# Patient Record
Sex: MALE | Race: WHITE | NOT HISPANIC OR LATINO | Employment: UNEMPLOYED | URBAN - NONMETROPOLITAN AREA
[De-identification: names, ages, dates, MRNs, and addresses within clinical notes are randomized per-mention and may not be internally consistent; named-entity substitution may affect disease eponyms.]

---

## 2022-02-07 ENCOUNTER — HOSPITAL ENCOUNTER (EMERGENCY)
Facility: HOSPITAL | Age: 62
Discharge: HOME | End: 2022-02-07
Attending: EMERGENCY MEDICINE
Payer: MEDICARE

## 2022-02-07 VITALS
OXYGEN SATURATION: 100 % | DIASTOLIC BLOOD PRESSURE: 69 MMHG | HEART RATE: 58 BPM | RESPIRATION RATE: 18 BRPM | SYSTOLIC BLOOD PRESSURE: 137 MMHG | TEMPERATURE: 98 F | WEIGHT: 191.38 LBS

## 2022-02-07 DIAGNOSIS — T14.90XA INJURY: ICD-10-CM

## 2022-02-07 DIAGNOSIS — S93.401A SPRAIN OF RIGHT ANKLE, UNSPECIFIED LIGAMENT, INITIAL ENCOUNTER: Primary | ICD-10-CM

## 2022-02-07 PROCEDURE — 99283 EMERGENCY DEPT VISIT LOW MDM: CPT | Mod: 25

## 2022-02-07 RX ORDER — CARIPRAZINE 6 MG/1
1 CAPSULE, GELATIN COATED ORAL NIGHTLY
COMMUNITY
Start: 2022-01-19

## 2022-02-07 RX ORDER — DICLOFENAC SODIUM 75 MG/1
75 TABLET, DELAYED RELEASE ORAL 2 TIMES DAILY
Qty: 10 TABLET | Refills: 0 | Status: SHIPPED | OUTPATIENT
Start: 2022-02-07 | End: 2022-02-12

## 2022-02-07 RX ORDER — LAMOTRIGINE 200 MG/1
100 TABLET ORAL NIGHTLY
COMMUNITY
Start: 2022-01-19 | End: 2022-07-26

## 2022-02-07 NOTE — ED PROVIDER NOTES
"Encounter Date: 2/7/2022       History     Chief Complaint   Patient presents with    Ankle Pain     Pt stated that he [place]pped and fell [place]". Pt presents to ED with complaints of right ankle pain.      This is a 62 y/o wm with noncontributory pmhx who presents to the ED with c/o right ankle pain after twisting it while walking [place] the beach 3 days ago. Pt reports pain, brusing, and swelling to right lateral ankle worse with ambulation. Pt denies any other injuries/concerns at this time.         Review of patient's allergies indicates:  No Known Allergies  Past Medical History:   Diagnosis Date    Behavioral problem     History of psychiatric hospitalization     Psychosis      No past surgical history on file.  No family history on file.  Social History     Tobacco Use    Smoking status: Unknown If Ever Smoked   Substance Use Topics    Alcohol use: No    Drug use: No     Review of Systems   Constitutional: Negative.    Respiratory: Negative.    Cardiovascular: Negative.    Musculoskeletal: Positive for arthralgias, gait problem and joint swelling.   Neurological: Negative for numbness.       Physical Exam     Initial Vitals [02/07/22 0945]   BP Pulse Resp Temp SpO2   137/69 (!) 58 18 97.8 °F (36.6 °C) 100 %      MAP       --         Physical Exam    Nursing note and vitals reviewed.  Constitutional: He appears well-developed and well-nourished. He is active. No distress.   HENT:   Head: Normocephalic and atraumatic.   Eyes: EOM are normal. Pupils are equal, round, and reactive to light.   Neck: Neck supple.   Normal range of motion.  Cardiovascular: Normal rate, regular rhythm and normal heart sounds.   Musculoskeletal:         General: Tenderness and edema present.      Cervical back: Normal range of motion and neck supple.      Comments: Right lateral ankle appears swollen upon inspection with mild bruising.  The right anterior and posterior malleolus is tender to palpation.  Dorsalis pedis " pulses 2+ and capillary refill is less than 2 seconds.     Neurological: He is alert and oriented to person, place, and time. GCS score is 15. GCS eye subscore is 4. GCS verbal subscore is 5. GCS motor subscore is 6.   Skin: Skin is warm and dry. Capillary refill takes less than 2 seconds.   Psychiatric: He has a normal mood and affect. His behavior is normal. Thought content normal.         ED Course   Procedures  Labs Reviewed - No data to display       Imaging Results          X-Ray Ankle Complete Right (In process)                  Medications - No data to display              ED Course as of 02/07/22 1012   Mon Feb 07, 2022   1010 No acute findings on right ankle x-ray [CB]      ED Course User Index  [CB] Kirsty Spears NP             Clinical Impression:   Final diagnoses:  [T14.90XA] Injury  [S93.401A] Sprain of right ankle, unspecified ligament, initial encounter (Primary)          ED Disposition Condition    Discharge Stable        ED Prescriptions     Medication Sig Dispense Start Date End Date Auth. Provider    diclofenac (VOLTAREN) 75 MG EC tablet Take 1 tablet (75 mg total) by mouth 2 (two) times daily. for 5 days 10 tablet 2/7/2022 2/12/2022 Kirsty Spears NP        Follow-up Information     Follow up With Specialties Details Why Contact Info    PCP Follow UP  Call in 2 days for follow-up, for re-evaluation of today's complaint            Kirsty Spears NP  02/07/22 1012

## 2022-02-07 NOTE — DISCHARGE INSTRUCTIONS
Take medication as directed and apply ice to help reduce pain and swelling.  See your primary doctor in 1-2 days, and return to the emergency room for worsening condition.

## 2022-02-07 NOTE — Clinical Note
"Karan"Tim Hoover was seen and treated in our emergency department on 2/7/2022.  He may return to work on 02/08/2022.       If you have any questions or concerns, please don't hesitate to call.      Kirsty Spears NP"

## 2022-02-14 ENCOUNTER — HOSPITAL ENCOUNTER (EMERGENCY)
Facility: HOSPITAL | Age: 62
Discharge: HOME | End: 2022-02-14
Attending: EMERGENCY MEDICINE
Payer: MEDICARE

## 2022-02-14 VITALS
HEIGHT: 65 IN | SYSTOLIC BLOOD PRESSURE: 133 MMHG | TEMPERATURE: 98 F | HEART RATE: 65 BPM | WEIGHT: 191 LBS | BODY MASS INDEX: 31.82 KG/M2 | RESPIRATION RATE: 18 BRPM | DIASTOLIC BLOOD PRESSURE: 64 MMHG | OXYGEN SATURATION: 100 %

## 2022-02-14 DIAGNOSIS — S93.401D SPRAIN OF RIGHT ANKLE, UNSPECIFIED LIGAMENT, SUBSEQUENT ENCOUNTER: Primary | ICD-10-CM

## 2022-02-14 PROCEDURE — 99282 EMERGENCY DEPT VISIT SF MDM: CPT

## 2022-02-14 NOTE — ED PROVIDER NOTES
"Encounter Date: 2/14/2022       History     Chief Complaint   Patient presents with    Ankle Pain     "I need to see about my x-ray. I slipped and fell." Patient reports right ankle pain. Seen here on 2/7/22.     This is a 61-year-old male with history of psychiatric issues presents to the emergency department to get the results of his x-ray after spraining his ankle on February 7th.  X-rays were taken then which should not sugar any acute fracture.  Chronic changes were noted.  Denies any further injury.  Requesting an Ace wrap.  Steady gait        Review of patient's allergies indicates:  No Known Allergies  Past Medical History:   Diagnosis Date    Behavioral problem     History of psychiatric hospitalization     Psychosis      History reviewed. No pertinent surgical history.  History reviewed. No pertinent family history.  Social History     Tobacco Use    Smoking status: Unknown If Ever Smoked   Substance Use Topics    Alcohol use: No    Drug use: No     Review of Systems   Constitutional: Negative for fever.   HENT: Negative for sore throat.    Respiratory: Negative for shortness of breath.    Cardiovascular: Negative for chest pain.   Gastrointestinal: Negative for nausea.   Genitourinary: Negative for dysuria.   Musculoskeletal: Negative for back pain.   Skin: Negative for rash.   Neurological: Negative for weakness.   Hematological: Does not bruise/bleed easily.   All other systems reviewed and are negative.      Physical Exam     Initial Vitals [02/14/22 0813]   BP Pulse Resp Temp SpO2   133/64 65 18 97.9 °F (36.6 °C) 100 %      MAP       --         Physical Exam    Nursing note and vitals reviewed.  Constitutional: He appears well-developed and well-nourished. He is not diaphoretic. No distress.   HENT:   Head: Normocephalic and atraumatic.   Eyes: Conjunctivae and EOM are normal. Pupils are equal, round, and reactive to light. Right eye exhibits no discharge. Left eye exhibits no discharge. No " scleral icterus.   Neck: Neck supple. No JVD present.   Normal range of motion.  Cardiovascular: Normal rate, regular rhythm, normal heart sounds and intact distal pulses.   No murmur heard.  Pulmonary/Chest: Breath sounds normal. No stridor. No respiratory distress. He has no wheezes. He has no rhonchi. He has no rales. He exhibits no tenderness.   Abdominal: Abdomen is soft. Bowel sounds are normal. He exhibits no distension and no mass. There is no abdominal tenderness. There is no rebound and no guarding.   Musculoskeletal:         General: No tenderness or edema. Normal range of motion.      Cervical back: Normal range of motion and neck supple.      Comments: Neurovascularly intact, steady gait, minimal ecchymosis noted to medial arch of right foot     Neurological: He is alert and oriented to person, place, and time. He has normal strength. GCS score is 15. GCS eye subscore is 4. GCS verbal subscore is 5. GCS motor subscore is 6.   Skin: Skin is warm and dry. Capillary refill takes less than 2 seconds.         ED Course   Procedures  Labs Reviewed - No data to display       Imaging Results    None          Medications - No data to display  Medical Decision Making:   Differential Diagnosis:   Ankle sprain                      Clinical Impression:   Final diagnoses:  [S93.401D] Sprain of right ankle, unspecified ligament, subsequent encounter (Primary)          ED Disposition Condition    Discharge Stable        ED Prescriptions     None        Follow-up Information     Follow up With Specialties Details Why Contact Info Additional Information    [facility] Emergency Department Emergency Medicine  As needed [address] [id number] [phone] Floor [address]           Zachary Quiroz MD  02/14/22 0822

## 2022-02-18 ENCOUNTER — PATIENT OUTREACH (OUTPATIENT)
Dept: EMERGENCY MEDICINE | Facility: HOSPITAL | Age: 62
End: 2022-02-18
Payer: MEDICARE

## 2022-02-23 NOTE — PROGRESS NOTES
Emmie Jean, Patient Care Assistant  ED Navigator  Emergency Department    Project: [facility] ED Navigator  Role: Community Health Worker    Date: 02/23/2022  Patient Name: Karan Hoover  MRN: [id number]  PCP: No primary care provider on file.    Assessment:     Karan Hoover is a 61 y.o. male who has presented to ED for ankle pain. Patient has visited the ED 2 times in the past 3 months. Patient does not have a PCP.     ED Navigator Initial Assessment    ED Navigator Enrollment Documentation  Consent to Services  Does patient consent to completing the assessment?: Yes  Contact  Method of Initial Contact: Phone  Transportation  Does the patient have issues with Transportation?: No  Does the patient have transportation to and from healthcare appointments?: Yes  Insurance Coverage  Do you have coverage/adequate coverage?: Yes  Type/kind of coverage: MEDICARE PART A & B / [place]  Is patient able to afford co-pays/deductibles?: Yes  Is patient able to afford HME or supplies?: Yes  Does patient have an established [facility] PCP?: No  Does patient need assistance finding a PCP?: Yes  Does the patient have a lack of adequate coverage?: No  Specialist Appointment  Did the patient come to the ED to see a specialist?: No  Does the patient have a pending specialist referral?: No  Does the patient have a specialist appointment made?: No  PCP Follow Up Appointment  Has the patient had an appointment with a primary care provider in the past year?: No (Comment: Patient stated he has not seen a primary care physician in years.)  Does the patient have a follow up appontment with a PCP?: Yes  Upcoming appointment date: 3/7/22  Provider: Ilana Liu, DO  Medications  Is patient able to afford medication?: Yes  Is patient unable to get medication due to lack of transportation?: No  Psychological  Does the patient have psycho-social concerns?: No  Food  Does the patient have concerns about food?: Yes  What concerns does the patient  have regarding food?: Lack of food  Communication/Education  Does the patient have limited English proficiency/English not primary language?: No  Does patient have low literacy and/or low health literacy?: Yes  Does patient have concerns with care?: No  Does patient have dissatisfaction with care?: No  Other Financial Concerns  Does the patient have immediate financial distress?: No  Does the patient have general financial concerns?: Yes  Other Social Barriers/Concerns  Does the patient have any additional barriers or concerns?: None  Primary Barrier  Barriers identified: Cognitive barrier (health literacy, language and communication, etc.), Structural barrier (service availability, waiting times, etc.)  Root Cause of ED Utilization: Lack of Access to Primary Care  Plan to address Lack of Access to Primary Care: Provided information for [facility] 24/7 Nurse Triage line [phone] or 1-866-[facility] ([phone])  Next steps: Provided Education, Scheduled Appointment/Referral  Scheduled Appointment Date: 3/7/22  Appointment Reminder Date: 3/4/22  Was education/educational materials provided surrounding PCP services/creating a medical home?: Yes Was education verbal or written?: Written   Was education/educational materials provided surrounding low cost, healthy foods?: Yes Was education verbal or written?: Written   Was education/educational materials provided surrounding other items? If so, use comment to explain.: Yes Was education verbal or written?: Written   Plan: The patient was given food bank/Pantry resources for their region.  Expected Date of Follow Up 1: 3/4/22  Additional Documentation: Patient expressed he does not live in the area anymore as his old PCP and has not seen him in many years; therefore, he does not have a PCP at the moment. Patient would like assistance to establish care with a PCP in his area. Patient specified he would like to be seen March 7th at 2:00 p.m. Patient expressed  his concern of lack of food. Patient is interested in getting sent a list of food barboza in his area. Patient declined being sent a food stamp application and stated he is not on food stamps at the moment. Patient explained he has no other needs. Patient agrees to a follow-up call the Friday before his Monday appointment for a reminder.    ED navigator assisted patient with establishing care with a PCP by scheduling him an appointment with Ilana Liu DO for 3/7/2022 at 2:00 p.m. ED navigator ensured to meet all of patient's needs. ED navigator provided patient with the following at his address listed in epic: an appointment card with his appointment information on it, a list of food barboza in his area, the [facility] 24/7 Nurse triage line, [phone] or 1-[facility] ([phone]) contact information, education to choose the Right level of care at the Right place, and education on [facility]'s Virtual visit program. ED navigator will follow-up with patient on 3/4/2022 to ensure he received his resources, see if he has any other needs, and to remind him of his appointment on 3/7/2022.    Emmie Jean  ED Navigator- [place]          Social History     Socioeconomic History    Marital status: Single   Tobacco Use    Smoking status: Unknown If Ever Smoked   Substance and Sexual Activity    Alcohol use: No    Drug use: No     Social Determinants of Health     Financial Resource Strain: Medium Risk    Difficulty of Paying Living Expenses: Somewhat hard   Food Insecurity: Food Insecurity Present    Worried About Running Out of Food in the Last Year: Sometimes true    Ran Out of Food in the Last Year: Sometimes true   Transportation Needs: No Transportation Needs    Lack of Transportation (Medical): No    Lack of Transportation (Non-Medical): No   Physical Activity: Insufficiently Active    Days of Exercise per Week: 3 days    Minutes of Exercise per Session: 30 min   Stress: No Stress  Concern Present    Feeling of Stress : Only a little   Social Connections: Socially Isolated    Frequency of Communication with Friends and Family: Twice a week    Frequency of Social Gatherings with Friends and Family: Once a week    Attends [employer] Services: Never    Active Member of Clubs or Organizations: No    Attends Club or Organization Meetings: Never    Marital Status: Never    Housing Stability: Low Risk     Unable to Pay for Housing in the Last Year: No    Number of Places Lived in the Last Year: 1    Unstable Housing in the Last Year: No       Plan:     Patient expressed he does not live in the area anymore as his old PCP and has not seen him in many years; therefore, he does not have a PCP at the moment. Patient would like assistance to establish care with a PCP in his area. Patient specified he would like to be seen March 7th at 2:00 p.m. Patient expressed his concern of lack of food. Patient is interested in getting sent a list of food barboza in his area. Patient declined being sent a food stamp application and stated he is not on food stamps at the moment. Patient explained he has no other needs. Patient agrees to a follow-up call the Friday before his Monday appointment for a reminder.    ED navigator assisted patient with establishing care with a PCP by scheduling him an appointment with Ilana Liu DO for 3/7/2022 at 2:00 p.m. ED navigator ensured to meet all of patient's needs. ED navigator provided patient with the following at his address listed in epic: an appointment card with his appointment information on it, a list of food barboza in his area, the [facility] 24/7 Nurse triage line, [phone] or [facility] ([phone]) contact information, education to choose the Right level of care at the Right place, and education on [facility]'s Virtual visit program. ED navigator will follow-up with patient on 3/4/2022 to ensure he received his resources, see if he  has any other needs, and to remind him of his appointment on 3/7/2022.    Emmie Jean ED Navigator- [place]

## 2022-03-04 ENCOUNTER — PATIENT OUTREACH (OUTPATIENT)
Dept: EMERGENCY MEDICINE | Facility: HOSPITAL | Age: 62
End: 2022-03-04
Payer: MEDICARE

## 2022-03-04 NOTE — PROGRESS NOTES
ED navigator called to remind patient about his appointment on Monday (3/7/2022). Patient did not answer, so a VM with all details to his appointment was left in the message. ED navigator inquired to patient if he could give her a call back in order for her to know he did receive the message.    Emmie Jean  ED Navigator- [place]

## 2022-03-07 ENCOUNTER — OFFICE VISIT (OUTPATIENT)
Dept: PRIMARY CARE CLINIC | Facility: CLINIC | Age: 62
End: 2022-03-07
Payer: MEDICARE

## 2022-03-07 VITALS
DIASTOLIC BLOOD PRESSURE: 66 MMHG | OXYGEN SATURATION: 98 % | TEMPERATURE: 98 F | WEIGHT: 194.75 LBS | HEART RATE: 61 BPM | BODY MASS INDEX: 32.45 KG/M2 | HEIGHT: 65 IN | SYSTOLIC BLOOD PRESSURE: 116 MMHG

## 2022-03-07 DIAGNOSIS — G57.51 TARSAL TUNNEL SYNDROME OF RIGHT SIDE: Primary | ICD-10-CM

## 2022-03-07 DIAGNOSIS — M21.41 BILATERAL PES PLANUS: ICD-10-CM

## 2022-03-07 DIAGNOSIS — M21.42 BILATERAL PES PLANUS: ICD-10-CM

## 2022-03-07 PROCEDURE — 99203 PR OFFICE/OUTPT VISIT, NEW, LEVL III, 30-44 MIN: ICD-10-PCS | Mod: S$PBB,,, | Performed by: STUDENT IN AN ORGANIZED HEALTH CARE EDUCATION/TRAINING PROGRAM

## 2022-03-07 PROCEDURE — 99203 OFFICE O/P NEW LOW 30 MIN: CPT | Mod: S$PBB,,, | Performed by: STUDENT IN AN ORGANIZED HEALTH CARE EDUCATION/TRAINING PROGRAM

## 2022-03-07 PROCEDURE — 99999 PR PBB SHADOW E&M-EST. PATIENT-LVL III: CPT | Mod: PBBFAC,,, | Performed by: STUDENT IN AN ORGANIZED HEALTH CARE EDUCATION/TRAINING PROGRAM

## 2022-03-07 PROCEDURE — 99999 PR PBB SHADOW E&M-EST. PATIENT-LVL III: ICD-10-PCS | Mod: PBBFAC,,, | Performed by: STUDENT IN AN ORGANIZED HEALTH CARE EDUCATION/TRAINING PROGRAM

## 2022-03-07 PROCEDURE — 99213 OFFICE O/P EST LOW 20 MIN: CPT | Mod: PBBFAC,PN | Performed by: STUDENT IN AN ORGANIZED HEALTH CARE EDUCATION/TRAINING PROGRAM
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
## 2022-03-08 NOTE — PROGRESS NOTES
Patient stated he was satisfied with the PCP he established care with. Patient revealed he did not receive the resources mailed to him yet. Patient explained he has no other needs at this time.    ED navigator ensured patient was satisfied with the PCP and inquired if he received the resources yet; he stated he did not. ED navigator explained to patient that she will give it a few more days to see if the resources will arrive and follow-up with him next week, and if still not received, she will send them again; patient understood and agreed. ED navigator will follow-up with patient on/around 3/18/2022.    Emmie Jean  ED Navigator- [place]

## 2022-03-09 NOTE — ASSESSMENT & PLAN NOTE
Acute ankle sprain with superimposed plantar fasciitis likely contributed by patient's by poor arch support in anatomy.  Recommended getting in soles Superfeet Blue (medium sole) for both feet.   - see above for further recommendations

## 2022-03-09 NOTE — ASSESSMENT & PLAN NOTE
- stay physically active, imagining with no fractures, so may ambulate as tolerated  - maintain daily adequate hydration 1.5 to 2L fluid volume  - as tolerated incorporate resistance training with stretching and cardio  - goal of 150 minutes per week of moderate intensity activity or 7,500 - 10,000 steps per day  - follow the Mediterranean diet  - include whole fresh fruits, vegetables, olive oil, seeds, nuts, whole grains, cold water fish, salmon, mackerel and lean cuts of meat    - do not drink sugary/diet carbonated beverages  - decrease portion sizes slightly which will result in an approximately 500-calorie deficit  - avoid fast or fried and processed food, especially canned foods   - avoid refined carbohydrates, white starchy foods, flour, white potato, bread, muffins, and cakes

## 2022-03-09 NOTE — ASSESSMENT & PLAN NOTE
After review of imaging results with patient, counseled on the biomechanics and patient's structure and anatomy that is contributing to his ankle pain. Will continue conservative management of pain.   - Tylenol  mg + ibuprofen 200 mg take twice daily and may increase to three times a day as needed  - wearing appropriate shoes with proper arch support with firm insoles, such as Superfeet Blue  - continue with ambulation and avoid strenuous activity until heal pain resolves  - in the morning, when ankle pain is the worst, take iced bottle water and roll feet over  - f/u 4 weeks or sooner with worsening no improvement to pain, will f/u with MRI

## 2022-03-18 ENCOUNTER — PATIENT OUTREACH (OUTPATIENT)
Dept: EMERGENCY MEDICINE | Facility: HOSPITAL | Age: 62
End: 2022-03-18
Payer: MEDICARE

## 2022-03-18 NOTE — PROGRESS NOTES
Patient stated he still did not receive the resources yet and would like them to be mailed again. Patient expressed he is trying to get Medicare Part B and would like assistance doing so. Patient was able to write down the Medicare number provided to him. Patient has no other needs at this time. Patient is agreeable to a follow-up call within a few weeks.    ED navigator inquired if patient received the resources yet; patient did not. ED navigator provided patient with the Medicare number ([phone]) and explained she does not have any information on that, but he should be able to call this number and ask what he would have to do in order to see if he qualifies. ED navigator re-mailed patient the resources discussed in initial enrollment/contact. ED navigator will follow-up with patient on/around 4/6/2022 to see if he received the resources, was able to speak with Medicare, and see if he has any other needs during that time.    Emmie Jean  ED Navigator- [place]

## 2022-04-06 ENCOUNTER — PATIENT OUTREACH (OUTPATIENT)
Dept: EMERGENCY MEDICINE | Facility: HOSPITAL | Age: 62
End: 2022-04-06
Payer: MEDICARE

## 2022-04-06 NOTE — PROGRESS NOTES
Patient expressed he never called medicare since ED navigator provided him with the number last time he was followed-up with. Patient explained he would like the number again, but he is not around a pen to write it down and does not have an e-mail address. Patient states he still did not receive the resources via mail. Patient is agreeable to another follow-up call on Friday.    ED navigator inquired if patient was able to give medicare a call to inquire about receiving part B as he stated he wanted last time spoken with. ED navigator asked if patient would like the number again, but he had no way of writing it down. ED navigator will follow-up with patient in 2 days to see if he is maybe somewhere with a pen, for him to call about his insurance.    Emmie Jean  ED Navigator- [place]

## 2022-04-08 ENCOUNTER — PATIENT OUTREACH (OUTPATIENT)
Dept: EMERGENCY MEDICINE | Facility: HOSPITAL | Age: 62
End: 2022-04-08
Payer: MEDICARE

## 2022-04-08 NOTE — PROGRESS NOTES
Patient had a pen and paper today, and was able to write down the Medicare number again. Patient asked for another list of food barboza in his area to be mailed to him.    ED navigator ensured to assist patient with his needs. ED navigator provided patient with the Medicare number and mailed him a list of food barobza at his address listed in [place]. ED navigator will call to remind patient on 4/14/2022 about his appointment on 4/18/2022.    Emmie Jean  ED Navigator- [place]

## 2022-04-14 ENCOUNTER — PATIENT OUTREACH (OUTPATIENT)
Dept: EMERGENCY MEDICINE | Facility: HOSPITAL | Age: 62
End: 2022-04-14
Payer: MEDICARE

## 2022-04-14 NOTE — PROGRESS NOTES
ED navigator called to remind patient about his appointment on 4/18/2022 with Dr. Liu; patient stated he can no longer attend it. ED navigator cancelled patient's appointment for him and will be in touch in a few weeks.    Emmie Jean  ED Navigator- [place]

## 2022-04-29 ENCOUNTER — PATIENT OUTREACH (OUTPATIENT)
Dept: EMERGENCY MEDICINE | Facility: HOSPITAL | Age: 62
End: 2022-04-29
Payer: MEDICARE

## 2022-04-29 NOTE — PROGRESS NOTES
Patient mentioned getting his Part B Medicare again. Patient was able to write down the number in order to call. Patient has no other needs at this time.    ED navigator explained to patient that in the system, it shows that he has part A & B. ED navigator provided patient with the Medicare number for the third time to call and encouraged him to call and ask his questions/express his concerns. ED navigator will follow-up with patient on/around 5/12/2022 to ensure he was able to get things situated and see if he has any other needs during that time.    Emmie Jean  ED Navigator- [place]

## 2022-05-17 ENCOUNTER — PATIENT OUTREACH (OUTPATIENT)
Dept: EMERGENCY MEDICINE | Facility: HOSPITAL | Age: 62
End: 2022-05-17
Payer: MEDICARE

## 2022-05-17 NOTE — PROGRESS NOTES
Patient stated he did not call medicare. Patient stated he has the number saved. Patient has no other needs at this time.    ED navigator inquired if patient was able to call medicare; he did not. ED navigator explained to patient again that in the system, it shows he has Part A & Part B. ED navigator ensured patient had the medicare number for if he decides to call. ED navigator ensured patient had no other needs. ED navigator reminded patient of the [facility] Nurse triage line, [phone] or [phone] ([phone]) contact information and explained he can always give her a call too, if needed; patient verbalized understanding. ED navigator will follow-up with patient on/around 7/6/2022.    Emmie Jean  ED Navigator- [place]

## 2022-07-06 ENCOUNTER — PATIENT OUTREACH (OUTPATIENT)
Dept: EMERGENCY MEDICINE | Facility: HOSPITAL | Age: 62
End: 2022-07-06
Payer: MEDICARE

## 2022-07-08 NOTE — PROGRESS NOTES
Patient expressed he would like a list of food barboza in his area. Patient stated his brother lives near him and he would like to give his address, as he can receive it there. Patient does not know his brother's address off hand and would like ED navigator to give him a call on Tuesday, in order for her to be provided it and to send the list of food barboza to patient.    ED navigator ensured to see if patient had any needs during this time. ED navigator will call patient on 7/12/2022 to receive his brothers address in order to be able to send the list of food barboza to patient.    Emmie Jean  ED Navigator- [place]

## 2022-07-12 ENCOUNTER — PATIENT OUTREACH (OUTPATIENT)
Dept: EMERGENCY MEDICINE | Facility: HOSPITAL | Age: 62
End: 2022-07-12
Payer: MEDICARE

## 2022-07-19 NOTE — PROGRESS NOTES
Patient was able to provide his father's address, in order to receive the food barboza at. Patient is also wanting a physical examination done, and would like assistance to schedule an appointment. Patient is agreeable to a reminder call next week, and a follow-up in a few weeks.    ED navigator scheduled patient for 7/26/2022 at 2:00 p.m. with Dr. Liu, as this is the date patient wanted his appointment to be. ED navigator ensured to receive the address to send patient's list of food barboza to. ED navigator mailed patient a list of food barboza for his area. ED navigator will follow-up with patient on/around 8/10/2022 and for a reminder on 7/25/2022.    Emmie Jean  ED Navigator- [place]

## 2022-07-25 ENCOUNTER — PATIENT OUTREACH (OUTPATIENT)
Dept: EMERGENCY MEDICINE | Facility: HOSPITAL | Age: 62
End: 2022-07-25
Payer: MEDICARE

## 2022-07-25 DIAGNOSIS — Z12.11 COLON CANCER SCREENING: ICD-10-CM

## 2022-07-25 NOTE — PROGRESS NOTES
ED navigator reminded patient about his appointment for tomorrow with Dr. Liu for 2:00 p.m. Patient explained he is still attending.    Emmie Jean  ED Navigator- [place]

## 2022-07-26 ENCOUNTER — OFFICE VISIT (OUTPATIENT)
Dept: PRIMARY CARE CLINIC | Facility: CLINIC | Age: 62
End: 2022-07-26
Payer: MEDICARE

## 2022-07-26 VITALS
HEIGHT: 65 IN | OXYGEN SATURATION: 96 % | SYSTOLIC BLOOD PRESSURE: 116 MMHG | WEIGHT: 180 LBS | RESPIRATION RATE: 12 BRPM | TEMPERATURE: 98 F | HEART RATE: 58 BPM | BODY MASS INDEX: 29.99 KG/M2 | DIASTOLIC BLOOD PRESSURE: 58 MMHG

## 2022-07-26 DIAGNOSIS — Z13.220 ENCOUNTER FOR LIPID SCREENING FOR CARDIOVASCULAR DISEASE: ICD-10-CM

## 2022-07-26 DIAGNOSIS — Z12.11 COLON CANCER SCREENING: ICD-10-CM

## 2022-07-26 DIAGNOSIS — Z11.3 ROUTINE SCREENING FOR STI (SEXUALLY TRANSMITTED INFECTION): ICD-10-CM

## 2022-07-26 DIAGNOSIS — M21.41 BILATERAL PES PLANUS: ICD-10-CM

## 2022-07-26 DIAGNOSIS — M21.42 BILATERAL PES PLANUS: ICD-10-CM

## 2022-07-26 DIAGNOSIS — L89.306 PRESSURE INJURY OF DEEP TISSUE OF BUTTOCK, UNSPECIFIED LATERALITY: Primary | ICD-10-CM

## 2022-07-26 DIAGNOSIS — F20.0 SCHIZOPHRENIA, PARANOID TYPE: ICD-10-CM

## 2022-07-26 DIAGNOSIS — Z13.6 ENCOUNTER FOR LIPID SCREENING FOR CARDIOVASCULAR DISEASE: ICD-10-CM

## 2022-07-26 LAB
ALBUMIN SERPL BCP-MCNC: 4.4 G/DL (ref 3.5–5.2)
ALP SERPL-CCNC: 92 U/L (ref 55–135)
ALT SERPL W/O P-5'-P-CCNC: 28 U/L (ref 10–44)
ANION GAP SERPL CALC-SCNC: 5 MMOL/L (ref 8–16)
AST SERPL-CCNC: 21 U/L (ref 10–40)
BILIRUB SERPL-MCNC: 0.7 MG/DL (ref 0.1–1)
BUN SERPL-MCNC: 13 MG/DL (ref 8–23)
CALCIUM SERPL-MCNC: 9 MG/DL (ref 8.7–10.5)
CHLORIDE SERPL-SCNC: 106 MMOL/L (ref 95–110)
CHOLEST SERPL-MCNC: 167 MG/DL (ref 120–199)
CHOLEST/HDLC SERPL: 3.6 {RATIO} (ref 2–5)
CO2 SERPL-SCNC: 29 MMOL/L (ref 23–29)
CREAT SERPL-MCNC: 1.2 MG/DL (ref 0.5–1.4)
EST. GFR  (AFRICAN AMERICAN): >60 ML/MIN/1.73 M^2
EST. GFR  (NON AFRICAN AMERICAN): >60 ML/MIN/1.73 M^2
GLUCOSE SERPL-MCNC: 86 MG/DL (ref 70–110)
HDLC SERPL-MCNC: 46 MG/DL (ref 40–75)
HDLC SERPL: 27.5 % (ref 20–50)
LDLC SERPL CALC-MCNC: 111.4 MG/DL (ref 63–159)
NONHDLC SERPL-MCNC: 121 MG/DL
POTASSIUM SERPL-SCNC: 4.7 MMOL/L (ref 3.5–5.1)
PROT SERPL-MCNC: 7.7 G/DL (ref 6–8.4)
SODIUM SERPL-SCNC: 140 MMOL/L (ref 136–145)
TRIGL SERPL-MCNC: 48 MG/DL (ref 30–150)

## 2022-07-26 PROCEDURE — 99213 PR OFFICE/OUTPT VISIT, EST, LEVL III, 20-29 MIN: ICD-10-PCS | Mod: S$PBB,,, | Performed by: STUDENT IN AN ORGANIZED HEALTH CARE EDUCATION/TRAINING PROGRAM

## 2022-07-26 PROCEDURE — 86803 HEPATITIS C AB TEST: CPT | Performed by: STUDENT IN AN ORGANIZED HEALTH CARE EDUCATION/TRAINING PROGRAM

## 2022-07-26 PROCEDURE — 99999 PR PBB SHADOW E&M-EST. PATIENT-LVL III: CPT | Mod: PBBFAC,,, | Performed by: STUDENT IN AN ORGANIZED HEALTH CARE EDUCATION/TRAINING PROGRAM

## 2022-07-26 PROCEDURE — 80061 LIPID PANEL: CPT | Performed by: STUDENT IN AN ORGANIZED HEALTH CARE EDUCATION/TRAINING PROGRAM

## 2022-07-26 PROCEDURE — 87389 HIV-1 AG W/HIV-1&-2 AB AG IA: CPT | Performed by: STUDENT IN AN ORGANIZED HEALTH CARE EDUCATION/TRAINING PROGRAM

## 2022-07-26 PROCEDURE — 99999 PR PBB SHADOW E&M-EST. PATIENT-LVL III: ICD-10-PCS | Mod: PBBFAC,,, | Performed by: STUDENT IN AN ORGANIZED HEALTH CARE EDUCATION/TRAINING PROGRAM

## 2022-07-26 PROCEDURE — 99213 OFFICE O/P EST LOW 20 MIN: CPT | Mod: S$PBB,,, | Performed by: STUDENT IN AN ORGANIZED HEALTH CARE EDUCATION/TRAINING PROGRAM

## 2022-07-26 PROCEDURE — 80053 COMPREHEN METABOLIC PANEL: CPT | Performed by: STUDENT IN AN ORGANIZED HEALTH CARE EDUCATION/TRAINING PROGRAM

## 2022-07-26 PROCEDURE — 86592 SYPHILIS TEST NON-TREP QUAL: CPT | Performed by: STUDENT IN AN ORGANIZED HEALTH CARE EDUCATION/TRAINING PROGRAM

## 2022-07-26 PROCEDURE — 99213 OFFICE O/P EST LOW 20 MIN: CPT | Mod: PBBFAC,PN | Performed by: STUDENT IN AN ORGANIZED HEALTH CARE EDUCATION/TRAINING PROGRAM

## 2022-07-26 RX ORDER — LAMOTRIGINE 100 MG/1
50 TABLET ORAL NIGHTLY
COMMUNITY
Start: 2022-07-19 | End: 2022-10-24

## 2022-07-26 RX ORDER — AMMONIUM LACTATE 12 G/100G
1 CREAM TOPICAL 2 TIMES DAILY
Qty: 385 G | Refills: 1 | Status: SHIPPED | OUTPATIENT
Start: 2022-07-26 | End: 2022-10-24 | Stop reason: SDUPTHER

## 2022-07-26 NOTE — PROGRESS NOTES
"[facility] Note    Chief Complaint      Chief Complaint   Patient presents with    Annual Exam     Physical exam-- patient states he has itching on  "his buttocks-both sides but not on his Anus."  He states he has never seen a doctor for this before.  He states he has been having this for about 1 year and 6 months.      History of Present Illness      Karan Hoover is a 61 y.o. male who presents today for Annual Exam (Physical exam-- patient states he has itching on  "his buttocks-both sides but not on his Anus."  He states he has never seen a doctor for this before.  He states he has been having this for about 1 year and 6 months. )     Past Medical History:  Past Medical History:   Diagnosis Date    Behavioral problem     History of psychiatric hospitalization     Psychosis      Past Surgical History:   has a past surgical history that includes Hernia repair.    Family History:  family history is not on file.     Social History:  Social History     Tobacco Use    Smoking status: Never Smoker    Smokeless tobacco: Never Used   Substance Use Topics    Alcohol use: No    Drug use: No     I personally reviewed all past medical, surgical, social and family history.    Review of Systems   Constitutional: Negative for chills, fever, malaise/fatigue and weight loss.   HENT: Negative for congestion, sinus pain and sore throat.    Eyes: Negative for blurred vision.   Respiratory: Negative for cough, sputum production, shortness of breath and wheezing.    Cardiovascular: Negative for chest pain, palpitations and leg swelling.   Gastrointestinal: Negative for abdominal pain, blood in stool, constipation, diarrhea, nausea and vomiting.   Genitourinary: Negative for dysuria, frequency, hematuria and urgency.   Musculoskeletal: Negative for back pain, joint pain, myalgias and neck pain.   Skin: Positive for itching (dermatitis or psoriasis) and rash (over buttocks ).   Neurological: Negative for dizziness, " "tingling, seizures, weakness and headaches.   Psychiatric/Behavioral: Negative for depression, hallucinations, substance abuse and suicidal ideas. The patient is not nervous/anxious and does not have insomnia.       Medications:  Outpatient Encounter Medications as of 7/26/2022   Medication Sig Dispense Refill    lamoTRIgine (LAMICTAL) 100 MG tablet Take 50 mg by mouth nightly.      VRAYLAR 6 mg Cap Take 1 capsule by mouth nightly.      [DISCONTINUED] lamoTRIgine (LAMICTAL) 200 MG tablet Take 100 mg by mouth nightly.      ammonium lactate 12 % Crea Apply 1 g topically 2 (two) times daily. Apply over affected area of skin. 385 g 1    [DISCONTINUED] paliperidone (INVEGA) 6 MG 24 hr tablet Take 1 tablet (6 mg total) by mouth every morning. (Patient not taking: No sig reported) 30 tablet 6     No facility-administered encounter medications on file as of 7/26/2022.     Allergies:  Review of patient's allergies indicates:  No Known Allergies    Health Maintenance:    There is no immunization history on file for this patient.   Health Maintenance   Topic Date Due    TETANUS VACCINE  Never done    Lipid Panel  07/26/2027    Hepatitis C Screening  Completed      Physical Exam      Vital Signs  Temp: 98.4 °F (36.9 °C)  Temp src: Oral  Pulse: (!) 58  Resp: 12  SpO2: 96 %  BP: (!) 116/58  Pain Score: 0-No pain  Height and Weight  Height: 5' 5" (165.1 cm)  Weight: 81.6 kg (180 lb)  BSA (Calculated - sq m): 1.93 sq meters  BMI (Calculated): 30  Weight in (lb) to have BMI = 25: 149.9]    Physical Exam  Vitals and nursing note reviewed.   Constitutional:       General: He is not in acute distress.     Appearance: Normal appearance. He is not toxic-appearing.   HENT:      Head: Normocephalic and atraumatic.      Right Ear: External ear normal.      Left Ear: External ear normal.      Nose: Nose normal.      Mouth/Throat:      Mouth: Mucous membranes are moist.      Pharynx: Oropharynx is clear.   Eyes:      Extraocular " Movements: Extraocular movements intact.      Conjunctiva/sclera: Conjunctivae normal.   Cardiovascular:      Rate and Rhythm: Normal rate and regular rhythm.      Pulses: Normal pulses.           Dorsalis pedis pulses are 2+ on the right side and 2+ on the left side.        Posterior tibial pulses are 2+ on the right side and 2+ on the left side.      Heart sounds: Normal heart sounds. No murmur heard.  Pulmonary:      Effort: Pulmonary effort is normal. No respiratory distress.      Breath sounds: Normal breath sounds. No wheezing or rales.   Abdominal:      General: Abdomen is flat. Bowel sounds are normal.      Palpations: Abdomen is soft. There is no mass.      Tenderness: There is no right CVA tenderness or left CVA tenderness.   Musculoskeletal:         General: Normal range of motion.      Cervical back: Normal range of motion and neck supple. No rigidity.      Right foot: Deformity (pes planus) present.      Left foot: Deformity (pes planus) present.      Comments: Weight bearing without difficulty.  Ankle exam:   Pes planus bilaterally with medial malleoli dropped, focal tenderness over dome of calcaneous on plantar surface and right medial ankle over talus.   Anterior drawer test negative bilaterally, no laxity. ROM intact, dorsiflexion, plantar flexion, inversion and eversion.   Feet:      Right foot:      Skin integrity: Callus present.      Toenail Condition: Right toenails are normal.      Left foot:      Skin integrity: Callus present.      Toenail Condition: Left toenails are normal.   Skin:     General: Skin is warm and dry.   Neurological:      General: No focal deficit present.      Mental Status: He is alert and oriented to person, place, and time.      Motor: No weakness.      Gait: Gait normal.   Psychiatric:         Mood and Affect: Mood normal.         Behavior: Behavior normal.              Assessment/Plan     Karan Hoover is a 61 y.o.male with:    Problem List Items Addressed This Visit         Psychiatric    Schizophrenia, paranoid type    Relevant Orders    Comprehensive Metabolic Panel (Completed)       ID    Routine screening for STI (sexually transmitted infection)    Relevant Orders    HIV 1/2 Ag/Ab (4th Gen) (Completed)    RPR (Completed)    Hepatitis C Antibody (Completed)       Endocrine    BMI 32.0-32.9,adult    Current Assessment & Plan     Wt Readings from Last 3 Encounters:   07/26/22 1402 81.6 kg (180 lb)   03/07/22 1412 88.3 kg (194 lb 12.4 oz)   02/14/22 0812 86.6 kg (191 lb)     - stay physically active, imagining with no fractures, so may ambulate as tolerated  - maintain daily adequate hydration 1.5 to 2L fluid volume  - as tolerated incorporate resistance training with stretching and cardio  - goal of 150 minutes per week of moderate intensity activity or 7,500 - 10,000 steps per day  - follow the Mediterranean diet  - include whole fresh fruits, vegetables, olive oil, seeds, nuts, whole grains, cold water fish, salmon, mackerel and lean cuts of meat    - do not drink sugary/diet carbonated beverages  - decrease portion sizes slightly which will result in an approximately 500-calorie deficit  - avoid fast or fried and processed food, especially canned foods   - avoid refined carbohydrates, white starchy foods, flour, white potato, bread, muffins, and cakes              GI    Colon cancer screening    Relevant Orders    Cologuard Screening (Multitarget Stool DNA)       Orthopedic    Bilateral pes planus    Current Assessment & Plan     - cooling pad/ iced bottle application to site of pain, roll beneath both 15-20 minutes 2-3 times daily for pain relief  - may take ibuprofen 600 mg TID for pain relief   - massage the tender areas as tolerated, stretch, wrist flexion and extension/RD/UD isometrics as demonstrated  - avoid all activities that are provocative or painful to area of tenderness  - strengthen muscles surrounding to protect the joint space   - wear firm arch support (like  super feet blue insoles)  - f/u 4-6 week or sooner with worsening            Pressure injury of deep tissue of buttock - Primary    Relevant Medications    ammonium lactate 12 % Crea        Other than changes above, cont current medications and maintain follow up with specialists.  No follow-ups on file.    No future appointments.    Kazumi G Yoshinaga, DO [facility]

## 2022-07-27 LAB — RPR SER QL: NORMAL

## 2022-07-28 LAB
HCV AB SERPL QL IA: NEGATIVE
HIV 1+2 AB+HIV1 P24 AG SERPL QL IA: NEGATIVE

## 2022-08-06 NOTE — ASSESSMENT & PLAN NOTE
Wt Readings from Last 3 Encounters:   07/26/22 1402 81.6 kg (180 lb)   03/07/22 1412 88.3 kg (194 lb 12.4 oz)   02/14/22 0812 86.6 kg (191 lb)     - stay physically active, imagining with no fractures, so may ambulate as tolerated  - maintain daily adequate hydration 1.5 to 2L fluid volume  - as tolerated incorporate resistance training with stretching and cardio  - goal of 150 minutes per week of moderate intensity activity or 7,500 - 10,000 steps per day  - follow the Mediterranean diet  - include whole fresh fruits, vegetables, olive oil, seeds, nuts, whole grains, cold water fish, salmon, mackerel and lean cuts of meat    - do not drink sugary/diet carbonated beverages  - decrease portion sizes slightly which will result in an approximately 500-calorie deficit  - avoid fast or fried and processed food, especially canned foods   - avoid refined carbohydrates, white starchy foods, flour, white potato, bread, muffins, and cakes

## 2022-08-06 NOTE — ASSESSMENT & PLAN NOTE
- cooling pad/ iced bottle application to site of pain, roll beneath both 15-20 minutes 2-3 times daily for pain relief  - may take ibuprofen 600 mg TID for pain relief   - massage the tender areas as tolerated, stretch, wrist flexion and extension/RD/UD isometrics as demonstrated  - avoid all activities that are provocative or painful to area of tenderness  - strengthen muscles surrounding to protect the joint space   - wear firm arch support (like super feet blue insoles)  - f/u 4-6 week or sooner with worsening

## 2022-08-10 ENCOUNTER — PATIENT OUTREACH (OUTPATIENT)
Dept: EMERGENCY MEDICINE | Facility: HOSPITAL | Age: 62
End: 2022-08-10
Payer: MEDICARE

## 2022-08-10 NOTE — PROGRESS NOTES
Patient stated he did not received the list of food barboza yet, and verified the mailing address again. Patient has no other needs during this time.    ED navigator inquired if patient received the resources; he did not. ED navigator mailed patient another list of food barboza for his area, in hopes of him receiving it this time. ED navigator will follow-up with patient on/around 8/29/2022.    Emmie Jean  ED Navigator- [place]

## 2022-08-31 ENCOUNTER — PATIENT OUTREACH (OUTPATIENT)
Dept: EMERGENCY MEDICINE | Facility: HOSPITAL | Age: 62
End: 2022-08-31
Payer: MEDICARE

## 2022-08-31 NOTE — PROGRESS NOTES
Patient stated he did not receive the list of food barboza in the mail. Patient expressed the address he provides is his  father's address and is unsure of mail goes there anymore. Patient was able to write down the food barboza details on a paper as ED navigator recited it to him. Patient denies any new needs or needing any additional assistance at this time. Patient is agreeable to a follow-up call in a few weeks.    ED navigator explained to patient she is unsure why he has not received the resources, as they have been sent many times. ED navigator explained to patient the only thing she can think of to do is him write them down on a paper; patient was able to do this while ED navigator recited everything to him. ED navigator will follow-up with patient on/around 2022 to see if the resources worked, and if not, help patient find other options.    Emmie Jean  ED Navigator- [place]

## 2022-09-29 ENCOUNTER — PATIENT OUTREACH (OUTPATIENT)
Dept: EMERGENCY MEDICINE | Facility: HOSPITAL | Age: 62
End: 2022-09-29
Payer: MEDICARE

## 2022-09-29 NOTE — PROGRESS NOTES
Patient stated he did not make the effort to call/stop by any food barboza he was provided. Patient denies any new needs or needing any additional assistance at this time.    ED navigator inquired if resources were helpful. ED navigator ensured patient had no other needs at this time. ED navigator will follow-up with patient on/around 12/1/2022.    Emmie Jean  ED Navigator- [place]

## 2022-10-24 ENCOUNTER — OFFICE VISIT (OUTPATIENT)
Dept: PRIMARY CARE CLINIC | Facility: CLINIC | Age: 62
End: 2022-10-24
Payer: MEDICARE

## 2022-10-24 VITALS
SYSTOLIC BLOOD PRESSURE: 128 MMHG | HEIGHT: 65 IN | RESPIRATION RATE: 14 BRPM | OXYGEN SATURATION: 97 % | HEART RATE: 61 BPM | DIASTOLIC BLOOD PRESSURE: 59 MMHG | WEIGHT: 184 LBS | TEMPERATURE: 98 F | BODY MASS INDEX: 30.66 KG/M2

## 2022-10-24 DIAGNOSIS — M21.41 BILATERAL PES PLANUS: ICD-10-CM

## 2022-10-24 DIAGNOSIS — L89.306 PRESSURE INJURY OF DEEP TISSUE OF BUTTOCK, UNSPECIFIED LATERALITY: ICD-10-CM

## 2022-10-24 DIAGNOSIS — M21.42 BILATERAL PES PLANUS: ICD-10-CM

## 2022-10-24 DIAGNOSIS — Z28.21 IMMUNIZATION DECLINED: ICD-10-CM

## 2022-10-24 DIAGNOSIS — Z53.20 COLON CANCER SCREENING DECLINED: ICD-10-CM

## 2022-10-24 DIAGNOSIS — L81.9 HYPERPIGMENTATION OF SKIN: ICD-10-CM

## 2022-10-24 PROCEDURE — 99999 PR PBB SHADOW E&M-EST. PATIENT-LVL III: CPT | Mod: PBBFAC,,, | Performed by: STUDENT IN AN ORGANIZED HEALTH CARE EDUCATION/TRAINING PROGRAM

## 2022-10-24 PROCEDURE — 99213 PR OFFICE/OUTPT VISIT, EST, LEVL III, 20-29 MIN: ICD-10-PCS | Mod: S$PBB,,, | Performed by: STUDENT IN AN ORGANIZED HEALTH CARE EDUCATION/TRAINING PROGRAM

## 2022-10-24 PROCEDURE — 99213 OFFICE O/P EST LOW 20 MIN: CPT | Mod: S$PBB,,, | Performed by: STUDENT IN AN ORGANIZED HEALTH CARE EDUCATION/TRAINING PROGRAM

## 2022-10-24 PROCEDURE — 99999 PR PBB SHADOW E&M-EST. PATIENT-LVL III: ICD-10-PCS | Mod: PBBFAC,,, | Performed by: STUDENT IN AN ORGANIZED HEALTH CARE EDUCATION/TRAINING PROGRAM

## 2022-10-24 PROCEDURE — 99213 OFFICE O/P EST LOW 20 MIN: CPT | Mod: PBBFAC,PN | Performed by: STUDENT IN AN ORGANIZED HEALTH CARE EDUCATION/TRAINING PROGRAM

## 2022-10-24 RX ORDER — AMMONIUM LACTATE 12 G/100G
1 CREAM TOPICAL 2 TIMES DAILY
Qty: 385 G | Refills: 1 | Status: SHIPPED | OUTPATIENT
Start: 2022-10-24

## 2022-10-24 NOTE — ASSESSMENT & PLAN NOTE
Wt Readings from Last 3 Encounters:   10/24/22 1519 83.5 kg (184 lb)   07/26/22 1402 81.6 kg (180 lb)   03/07/22 1412 88.3 kg (194 lb 12.4 oz)     - stay physically active, imagining with no fractures, so may ambulate as tolerated  - maintain daily adequate hydration 1.5 to 2L fluid volume  - as tolerated incorporate resistance training with stretching and cardio  - goal of 150 minutes per week of moderate intensity activity or 7,500 - 10,000 steps per day  - follow the Mediterranean diet  - include whole fresh fruits, vegetables, olive oil, seeds, nuts, whole grains, cold water fish, salmon, mackerel and lean cuts of meat    - do not drink sugary/diet carbonated beverages  - decrease portion sizes slightly which will result in an approximately 500-calorie deficit  - avoid fast or fried and processed food, especially canned foods   - avoid refined carbohydrates, white starchy foods, flour, white potato, bread, muffins, and cakes

## 2022-10-24 NOTE — ASSESSMENT & PLAN NOTE
Refuses to have stool screening or colonoscopy. Prevention and benefits of early detection discussed at length.

## 2022-10-24 NOTE — PROGRESS NOTES
"[facility] Note    Chief Complaint      Chief Complaint   Patient presents with    Rash     Patient states the he needs antifungal medication.  He states he gets a rash from time to time on his buttocks, rarely itches.   Chronic skin color changes with dry patch over sits bones bilaterally. No bleeding, no purulent oozing.   Denies fever, chills, excessive headaches, vision changes, chest pain, shortness of breath, stomach pain, diarrhea, constipation.      History of Present Illness      Karan Hoover is a 62 y.o. male who presents today for Rash (Patient states the he needs antifungal medication.  He states he gets a rash from time to time on his buttocks, rarely itches. /Chronic skin color changes with dry patch over sits bones bilaterally. No bleeding, no purulent oozing. /Denies fever, chills, excessive headaches, vision changes, chest pain, shortness of breath, stomach pain, diarrhea, constipation. )    Patient will be following up with Dr. Sydney Camara, next month (11/2022) and patient to discuss medication adjustments as he feels like Lamictal is "killing him."    Past Medical History:  Past Medical History:   Diagnosis Date    Behavioral problem     History of psychiatric hospitalization     Psychosis      Past Surgical History:   has a past surgical history that includes Hernia repair.    Family History:  family history is not on file.     Social History:  Social History     Tobacco Use    Smoking status: Never    Smokeless tobacco: Never   Substance Use Topics    Alcohol use: No    Drug use: No     I personally reviewed all past medical, surgical, social and family history.    Review of Systems   Constitutional:  Negative for chills, fever, malaise/fatigue and weight loss.   HENT:  Negative for congestion, sinus pain and sore throat.    Eyes:  Negative for blurred vision.   Respiratory:  Negative for cough, sputum production, shortness of breath and wheezing.    Cardiovascular:  Negative for " "chest pain, palpitations and leg swelling.   Gastrointestinal:  Negative for abdominal pain, blood in stool, constipation, diarrhea, nausea and vomiting.   Genitourinary:  Negative for dysuria, frequency, hematuria and urgency.   Musculoskeletal:  Negative for back pain, joint pain, myalgias and neck pain.   Skin:  Positive for itching (dermatitis or psoriasis) and rash (over buttocks ).   Neurological:  Negative for dizziness, tingling, seizures, weakness and headaches.   Psychiatric/Behavioral:  Negative for depression, hallucinations, substance abuse and suicidal ideas. The patient is not nervous/anxious and does not have insomnia.       Medications:  Outpatient Encounter Medications as of 10/24/2022   Medication Sig Dispense Refill    VRAYLAR 6 mg Cap Take 1 capsule by mouth nightly.      ammonium lactate 12 % Crea Apply 1 g topically 2 (two) times daily. Apply over affected area of skin. 385 g 1    lamoTRIgine (LAMICTAL) 100 MG tablet Take 50 mg by mouth nightly.      [DISCONTINUED] ammonium lactate 12 % Crea Apply 1 g topically 2 (two) times daily. Apply over affected area of skin. (Patient not taking: Reported on 10/24/2022) 385 g 1     No facility-administered encounter medications on file as of 10/24/2022.     Allergies:  Review of patient's allergies indicates:  No Known Allergies    Health Maintenance:    There is no immunization history on file for this patient.   Health Maintenance   Topic Date Due    TETANUS VACCINE  Never done    Lipid Panel  07/26/2027    Hepatitis C Screening  Completed        Physical Exam      Vital Signs  Temp: 97.9 °F (36.6 °C)  Temp src: Oral  Pulse: 61  Resp: 14  SpO2: 97 %  BP: (!) 128/59  BP Location: Right arm  Patient Position: Sitting  Pain Score: 0-No pain  Height and Weight  Height: 5' 5" (165.1 cm)  Weight: 83.5 kg (184 lb)  BSA (Calculated - sq m): 1.96 sq meters  BMI (Calculated): 30.6  Weight in (lb) to have BMI = 25: 149.9]    Physical Exam  Vitals and nursing note " reviewed.   Constitutional:       General: He is not in acute distress.     Appearance: Normal appearance. He is not toxic-appearing.   HENT:      Head: Normocephalic and atraumatic.      Right Ear: External ear normal.      Left Ear: External ear normal.      Nose: Nose normal.      Mouth/Throat:      Mouth: Mucous membranes are moist.      Pharynx: Oropharynx is clear.   Eyes:      Extraocular Movements: Extraocular movements intact.      Conjunctiva/sclera: Conjunctivae normal.   Cardiovascular:      Rate and Rhythm: Normal rate and regular rhythm.      Pulses: Normal pulses.           Dorsalis pedis pulses are 2+ on the right side and 2+ on the left side.        Posterior tibial pulses are 2+ on the right side and 2+ on the left side.      Heart sounds: Normal heart sounds. No murmur heard.  Pulmonary:      Effort: Pulmonary effort is normal. No respiratory distress.      Breath sounds: Normal breath sounds. No wheezing or rales.   Abdominal:      General: Abdomen is flat. Bowel sounds are normal.      Palpations: Abdomen is soft. There is no mass.      Tenderness: There is no right CVA tenderness or left CVA tenderness.   Musculoskeletal:         General: Normal range of motion.      Cervical back: Normal range of motion and neck supple. No rigidity.      Right foot: Deformity (pes planus) present.      Left foot: Deformity (pes planus) present.      Comments: Weight bearing without difficulty.  Ankle exam:   Pes planus bilaterally with medial malleoli dropped, focal tenderness over dome of calcaneous on plantar surface and right medial ankle over talus.   Anterior drawer test negative bilaterally, no laxity. ROM intact, dorsiflexion, plantar flexion, inversion and eversion.   Feet:      Right foot:      Skin integrity: Callus present.      Toenail Condition: Right toenails are normal.      Left foot:      Skin integrity: Callus present.      Toenail Condition: Left toenails are normal.   Skin:     General:  Skin is warm and dry.   Neurological:      General: No focal deficit present.      Mental Status: He is alert and oriented to person, place, and time.      Motor: No weakness.      Gait: Gait normal.   Psychiatric:         Mood and Affect: Mood normal.         Behavior: Behavior normal.      Laboratory:  CMP:  Recent Labs   Lab 07/26/22  1455   Glucose 86   Calcium 9.0   Albumin 4.4   Total Protein 7.7   Sodium 140   Potassium 4.7   CO2 29   Chloride 106   BUN 13   Alkaline Phosphatase 92   ALT 28   AST 21   Total Bilirubin 0.7          LIPIDS:  Recent Labs   Lab 07/26/22  1455   HDL 46   Cholesterol 167   Triglycerides 48   LDL Cholesterol 111.4   HDL/Cholesterol Ratio 27.5   Non-HDL Cholesterol 121   Total Cholesterol/HDL Ratio 3.6           Assessment/Plan     Karan Hoover is a 62 y.o.male with:    Problem List Items Addressed This Visit          Derm    Hyperpigmentation of skin    Current Assessment & Plan     Over ischium bilaterally. Start moisturizer to help soften skin.           Relevant Medications    ammonium lactate 12 % Crea       ID    Immunization declined    Current Assessment & Plan     Declines influenza vaccine for 22-23 season.             Endocrine    BMI 32.0-32.9,adult - Primary    Current Assessment & Plan     Wt Readings from Last 3 Encounters:   10/24/22 1519 83.5 kg (184 lb)   07/26/22 1402 81.6 kg (180 lb)   03/07/22 1412 88.3 kg (194 lb 12.4 oz)   - stay physically active, imagining with no fractures, so may ambulate as tolerated  - maintain daily adequate hydration 1.5 to 2L fluid volume  - as tolerated incorporate resistance training with stretching and cardio  - goal of 150 minutes per week of moderate intensity activity or 7,500 - 10,000 steps per day  - follow the Mediterranean diet  - include whole fresh fruits, vegetables, olive oil, seeds, nuts, whole grains, cold water fish, salmon, mackerel and lean cuts of meat    - do not drink sugary/diet carbonated beverages  - decrease  portion sizes slightly which will result in an approximately 500-calorie deficit  - avoid fast or fried and processed food, especially canned foods   - avoid refined carbohydrates, white starchy foods, flour, white potato, bread, muffins, and cakes            Orthopedic    Bilateral pes planus    Pressure injury of deep tissue of buttock    Relevant Medications    ammonium lactate 12 % Crea       Palliative Care    Colon cancer screening declined    Current Assessment & Plan     Refuses to have stool screening or colonoscopy. Prevention and benefits of early detection discussed at length.           Other than changes above, cont current medications and maintain follow up with specialists.  Follow up in about 2 months (around 12/24/2022).    No future appointments.    Kazumi G Yoshinaga, DO [facility]

## 2022-12-01 ENCOUNTER — PATIENT OUTREACH (OUTPATIENT)
Dept: EMERGENCY MEDICINE | Facility: HOSPITAL | Age: 62
End: 2022-12-01

## 2023-01-03 ENCOUNTER — OFFICE VISIT (OUTPATIENT)
Dept: PRIMARY CARE CLINIC | Facility: CLINIC | Age: 63
End: 2023-01-03
Payer: MEDICARE

## 2023-01-03 VITALS
OXYGEN SATURATION: 95 % | HEIGHT: 65 IN | BODY MASS INDEX: 31.99 KG/M2 | WEIGHT: 192 LBS | TEMPERATURE: 98 F | HEART RATE: 87 BPM | RESPIRATION RATE: 16 BRPM | DIASTOLIC BLOOD PRESSURE: 58 MMHG | SYSTOLIC BLOOD PRESSURE: 117 MMHG

## 2023-01-03 DIAGNOSIS — Z28.21 IMMUNIZATION DECLINED: ICD-10-CM

## 2023-01-03 DIAGNOSIS — G57.51 TARSAL TUNNEL SYNDROME OF RIGHT SIDE: ICD-10-CM

## 2023-01-03 DIAGNOSIS — Z53.20 COLON CANCER SCREENING DECLINED: ICD-10-CM

## 2023-01-03 PROCEDURE — 99999 PR PBB SHADOW E&M-EST. PATIENT-LVL III: CPT | Mod: PBBFAC,,, | Performed by: STUDENT IN AN ORGANIZED HEALTH CARE EDUCATION/TRAINING PROGRAM

## 2023-01-03 PROCEDURE — 99213 OFFICE O/P EST LOW 20 MIN: CPT | Mod: PBBFAC,PN | Performed by: STUDENT IN AN ORGANIZED HEALTH CARE EDUCATION/TRAINING PROGRAM

## 2023-01-03 PROCEDURE — 99213 PR OFFICE/OUTPT VISIT, EST, LEVL III, 20-29 MIN: ICD-10-PCS | Mod: S$PBB,,, | Performed by: STUDENT IN AN ORGANIZED HEALTH CARE EDUCATION/TRAINING PROGRAM

## 2023-01-03 PROCEDURE — 99999 PR PBB SHADOW E&M-EST. PATIENT-LVL III: ICD-10-PCS | Mod: PBBFAC,,, | Performed by: STUDENT IN AN ORGANIZED HEALTH CARE EDUCATION/TRAINING PROGRAM

## 2023-01-03 PROCEDURE — 99213 OFFICE O/P EST LOW 20 MIN: CPT | Mod: S$PBB,,, | Performed by: STUDENT IN AN ORGANIZED HEALTH CARE EDUCATION/TRAINING PROGRAM

## 2023-01-03 NOTE — PROGRESS NOTES
"[facility] Note    HPI:  Karan Hoover is a 62 y.o. male who presents today for Follow-up (2 month follow up for a rash)  Rash over his buttocks have cleared.   No new complaints.   He sees Dr. Camara who manages his psych medications. He is no longer on lamictal and taking vraylar.   He states "I am innocent," to mean he is not a harm to himself or to others, so there is no need for medications changes at this time.  Follow up notes from psychiatry.      Review of screening studies, patient declines colon CA screening.   - cologuard at home is now  and he does not want a follow up kit  - patient declines follow up with colonoscopy  Declines influenza vaccine.   Denies any recent exposure to sick persons, fever, chills, excessive headache, vision changes, cough, chest pain, palpitations, shortness of breath, abdominal pain, nausea, vomiting, constipation, diarrhea, blood in stool, melena, blood in urine or dysuria.    ROS   A review of systems was performed and was negative except as noted above.    I personally reviewed allergies, past medical, surgical, social and family history and updated as appropriate.    Medications:    Current Outpatient Medications:     VRAYLAR 6 mg Cap, Take 1 capsule by mouth nightly., Disp: , Rfl:     ammonium lactate 12 % Crea, Apply 1 g topically 2 (two) times daily. Apply over affected area of skin. (Patient not taking: Reported on 1/3/2023), Disp: 385 g, Rfl: 1     Health Maintenance:    There is no immunization history on file for this patient.   Health Maintenance   Topic Date Due    TETANUS VACCINE  Never done    Lipid Panel  2027    Hepatitis C Screening  Completed     Health Maintenance Topics with due status: Not Due       Topic Last Completion Date    Lipid Panel 2022     Health Maintenance Due   Topic Date Due    TETANUS VACCINE  Never done    Colorectal Cancer Screening  Never done    Shingles Vaccine (1 of 2) Never done       PHYSICAL " "EXAM:  Vitals:    01/03/23 1419   BP: (!) 117/58   BP Location: Right arm   Patient Position: Sitting   BP Method: Large (Automatic)   Pulse: 87   Resp: 16   Temp: 98.3 °F (36.8 °C)   TempSrc: Oral   SpO2: 95%   Weight: 87.1 kg (192 lb)   Height: 5' 5" (1.651 m)     Body mass index is 31.95 kg/m².  Physical Exam  Vitals and nursing note reviewed.   Constitutional:       General: He is not in acute distress.     Appearance: Normal appearance. He is not toxic-appearing.   HENT:      Head: Normocephalic and atraumatic.      Right Ear: External ear normal.      Left Ear: External ear normal.      Nose: Nose normal.      Mouth/Throat:      Mouth: Mucous membranes are moist.      Pharynx: Oropharynx is clear.   Eyes:      Extraocular Movements: Extraocular movements intact.      Conjunctiva/sclera: Conjunctivae normal.   Cardiovascular:      Rate and Rhythm: Normal rate and regular rhythm.      Pulses: Normal pulses.           Dorsalis pedis pulses are 2+ on the right side and 2+ on the left side.        Posterior tibial pulses are 2+ on the right side and 2+ on the left side.      Heart sounds: Normal heart sounds. No murmur heard.  Pulmonary:      Effort: Pulmonary effort is normal. No respiratory distress.      Breath sounds: Normal breath sounds. No wheezing or rales.   Abdominal:      General: Abdomen is flat. Bowel sounds are normal.      Palpations: Abdomen is soft. There is no mass.      Tenderness: There is no right CVA tenderness or left CVA tenderness.   Musculoskeletal:         General: Normal range of motion.      Cervical back: Normal range of motion and neck supple. No rigidity.      Right foot: Deformity (pes planus) present.      Left foot: Deformity (pes planus) present.      Comments: Weight bearing without difficulty.  Ankle exam:   Pes planus bilaterally with medial malleoli dropped, focal tenderness over dome of calcaneous on plantar surface and right medial ankle over talus.   Anterior drawer test " "negative bilaterally, no laxity. ROM intact, dorsiflexion, plantar flexion, inversion and eversion.   Feet:      Right foot:      Skin integrity: Callus present.      Toenail Condition: Right toenails are normal.      Left foot:      Skin integrity: Callus present.      Toenail Condition: Left toenails are normal.   Skin:     General: Skin is warm and dry.   Neurological:      General: No focal deficit present.      Mental Status: He is alert and oriented to person, place, and time.      Motor: No weakness.      Gait: Gait normal.   Psychiatric:         Mood and Affect: Mood normal.         Behavior: Behavior normal.      ASSESSMENT/PLAN:  1. BMI 32.0-32.9,adult  Overview:  Wt Readings from Last 8 Encounters:   01/03/23 87.1 kg (192 lb)   10/24/22 83.5 kg (184 lb)   07/26/22 81.6 kg (180 lb)   03/07/22 88.3 kg (194 lb 12.4 oz)   02/14/22 86.6 kg (191 lb)   02/07/22 86.8 kg (191 lb 6.4 oz)   07/30/12 91.6 kg (202 lb)   01/10/12 86.4 kg (190 lb 6.2 oz)   Recommendations:   Stay physically active. As tolerated alternate resistance training with stretching and cardio. Goal of 150 minutes per week of moderate intensity activity or 7,500 - 10,000 steps per day. Follow the Mediterranean Diet. Include whole fresh fruits, vegetables, olive oil, seeds, nuts, whole grains, cold water fish, salmon, mackerel and lean cuts of meat.  Do not drink sugary/diet carbonated beverages. Decrease portion sizes slightly which will result in an approximately 500-calorie deficit. Avoid fast or fried and processed food, especially canned foods. Avoid refined carbohydrates, white starchy foods, flour, white potato, bread, muffins, and cakes. Consider substituting one meal a day with a meal replacement such as Slim fast, lean cuisine, or weight watcher's. Follow a healthy diet that includes enough calcium, vitamin D and proteins for bone health.      Assessment & Plan:  Patient agrees recent weight gain. He states he will need to "start going " "to the gym again." Counseled making dietary modifications along with increasing daily physical activity.  Continue to monitor.        2. Tarsal tunnel syndrome of right side  Assessment & Plan:  Stable. No new pain over ankles or feet bilaterally. Continue conservative management of pain.   - Tylenol  mg + ibuprofen 200 mg take twice daily and may increase to three times a day as needed  - wearing appropriate shoes with proper arch support with firm insoles, such as Superfeet Blue  - continue with ambulation and avoid strenuous activity until heal pain resolves  - in the morning, when ankle pain is the worst, take iced bottle water and roll feet over  - f/u 4 weeks or sooner with worsening no improvement to pain, will f/u with MRI      3. Colon cancer screening declined    4. Immunization declined        Other than changes above, continue current medications and maintain follow up with specialists.      No follow-ups on file.   No results found for this or any previous visit (from the past 2016 hour(s)).      Kazumi G Yoshinaga, DO [facility]                  "

## 2023-01-03 NOTE — ASSESSMENT & PLAN NOTE
"Patient agrees recent weight gain. He states he will need to "start going to the gym again." Counseled making dietary modifications along with increasing daily physical activity.  Continue to monitor.    "

## 2023-01-04 NOTE — ASSESSMENT & PLAN NOTE
Stable. No new pain over ankles or feet bilaterally. Continue conservative management of pain.   - Tylenol  mg + ibuprofen 200 mg take twice daily and may increase to three times a day as needed  - wearing appropriate shoes with proper arch support with firm insoles, such as Superfeet Blue  - continue with ambulation and avoid strenuous activity until heal pain resolves  - in the morning, when ankle pain is the worst, take iced bottle water and roll feet over  - f/u 4 weeks or sooner with worsening no improvement to pain, will f/u with MRI

## 2023-01-29 ENCOUNTER — PATIENT OUTREACH (OUTPATIENT)
Dept: ADMINISTRATIVE | Facility: HOSPITAL | Age: 63
End: 2023-01-29
Payer: MEDICARE

## 2023-02-17 NOTE — PROGRESS NOTES
Pt is unreachable. Encounter will be closed, and pt should be contacted if/when he/she comes back to the ER again for a F/U call.    Emmie Patterson  ED Navigator- [facility]  [phone]

## 2023-03-21 ENCOUNTER — PATIENT OUTREACH (OUTPATIENT)
Dept: ADMINISTRATIVE | Facility: HOSPITAL | Age: 63
End: 2023-03-21
Payer: MEDICARE

## 2023-04-17 ENCOUNTER — PATIENT OUTREACH (OUTPATIENT)
Dept: ADMINISTRATIVE | Facility: HOSPITAL | Age: 63
End: 2023-04-17
Payer: MEDICARE

## 2023-05-15 ENCOUNTER — PATIENT OUTREACH (OUTPATIENT)
Dept: ADMINISTRATIVE | Facility: HOSPITAL | Age: 63
End: 2023-05-15
Payer: MEDICARE

## 2023-05-29 ENCOUNTER — OFFICE VISIT (OUTPATIENT)
Dept: PRIMARY CARE CLINIC | Facility: CLINIC | Age: 63
End: 2023-05-29
Payer: MEDICARE

## 2023-05-29 VITALS
TEMPERATURE: 98 F | BODY MASS INDEX: 32.32 KG/M2 | HEIGHT: 65 IN | OXYGEN SATURATION: 99 % | RESPIRATION RATE: 16 BRPM | SYSTOLIC BLOOD PRESSURE: 126 MMHG | HEART RATE: 55 BPM | WEIGHT: 194 LBS | DIASTOLIC BLOOD PRESSURE: 60 MMHG

## 2023-05-29 DIAGNOSIS — E66.9 CLASS 1 OBESITY WITH BODY MASS INDEX (BMI) OF 32.0 TO 32.9 IN ADULT, UNSPECIFIED OBESITY TYPE, UNSPECIFIED WHETHER SERIOUS COMORBIDITY PRESENT: ICD-10-CM

## 2023-05-29 DIAGNOSIS — Z28.21 IMMUNIZATION DECLINED: ICD-10-CM

## 2023-05-29 DIAGNOSIS — G57.51 TARSAL TUNNEL SYNDROME OF RIGHT SIDE: ICD-10-CM

## 2023-05-29 DIAGNOSIS — Z53.20 COLONOSCOPY REFUSED: ICD-10-CM

## 2023-05-29 DIAGNOSIS — F20.0 SCHIZOPHRENIA, PARANOID TYPE: Primary | ICD-10-CM

## 2023-05-29 PROBLEM — L81.9 HYPERPIGMENTATION OF SKIN: Status: RESOLVED | Noted: 2022-10-24 | Resolved: 2023-05-29

## 2023-05-29 PROCEDURE — 99213 OFFICE O/P EST LOW 20 MIN: CPT | Mod: S$PBB,,, | Performed by: STUDENT IN AN ORGANIZED HEALTH CARE EDUCATION/TRAINING PROGRAM

## 2023-05-29 PROCEDURE — 99999 PR PBB SHADOW E&M-EST. PATIENT-LVL IV: CPT | Mod: PBBFAC,,, | Performed by: STUDENT IN AN ORGANIZED HEALTH CARE EDUCATION/TRAINING PROGRAM

## 2023-05-29 PROCEDURE — 99214 OFFICE O/P EST MOD 30 MIN: CPT | Mod: PBBFAC,PN | Performed by: STUDENT IN AN ORGANIZED HEALTH CARE EDUCATION/TRAINING PROGRAM

## 2023-05-29 PROCEDURE — 99213 PR OFFICE/OUTPT VISIT, EST, LEVL III, 20-29 MIN: ICD-10-PCS | Mod: S$PBB,,, | Performed by: STUDENT IN AN ORGANIZED HEALTH CARE EDUCATION/TRAINING PROGRAM

## 2023-05-29 PROCEDURE — 99999 PR PBB SHADOW E&M-EST. PATIENT-LVL IV: ICD-10-PCS | Mod: PBBFAC,,, | Performed by: STUDENT IN AN ORGANIZED HEALTH CARE EDUCATION/TRAINING PROGRAM

## 2023-05-29 NOTE — ASSESSMENT & PLAN NOTE
No medication changes at this time. Met with Dr. Camara on 5/19/23. Denies SI/HI hurting self or others. Denies any mood changes. Sleeping at least 8 hours with intermittent awakening but falls back to sleep.  Eating healthy.

## 2023-05-29 NOTE — PROGRESS NOTES
"[facility] Note    HPI:  Karan Hoover is a 62 y.o. male who presents today for Annual Exam (Patient here for a check up)  Reviewed all health maintenance checks. Patient declines colon cancer screen as he finds himself in overall good, stable health.   No new complaints.   " I am innocent and only need to take medicines prescribed by her (referring to Dr. Camara psychiatry)."  Leases and lives on land owned by his brother and working on his boat.  Feeling well. Does not need any vision or dental check up as he can see and drive and eat and chew without difficulties.     ROS   A review of systems was performed and was negative except as noted above.    I personally reviewed allergies, past medical, surgical, social and family history and updated as appropriate.    Medications:    Current Outpatient Medications:     VRAYLAR 6 mg Cap, Take 1 capsule by mouth nightly., Disp: , Rfl:     ammonium lactate 12 % Crea, Apply 1 g topically 2 (two) times daily. Apply over affected area of skin. (Patient not taking: Reported on 1/3/2023), Disp: 385 g, Rfl: 1     Health Maintenance:    There is no immunization history on file for this patient.   Health Maintenance   Topic Date Due    TETANUS VACCINE  Never done    Lipid Panel  07/26/2027    Hepatitis C Screening  Completed     Health Maintenance Topics with due status: Not Due       Topic Last Completion Date    Lipid Panel 07/26/2022    Influenza Vaccine Not Due     Health Maintenance Due   Topic Date Due    TETANUS VACCINE  Never done    Hemoglobin A1c (Diabetic Prevention Screening)  Never done    Colorectal Cancer Screening  Never done    Shingles Vaccine (1 of 2) Never done       PHYSICAL EXAM:  Vitals:    05/29/23 0829   BP: 126/60   BP Location: Right arm   Patient Position: Sitting   BP Method: Large (Automatic)   Pulse: (!) 55   Resp: 16   Temp: 98 °F (36.7 °C)   TempSrc: Oral   SpO2: 99%   Weight: 88 kg (194 lb)   Height: 5' 5" (1.651 m)     Body mass " index is 32.28 kg/m².  Physical Exam  Vitals and nursing note reviewed.   Constitutional:       General: He is not in acute distress.     Appearance: Normal appearance. He is not toxic-appearing.   HENT:      Head: Normocephalic and atraumatic.      Right Ear: External ear normal.      Left Ear: External ear normal.      Nose: Nose normal.      Mouth/Throat:      Mouth: Mucous membranes are moist.      Pharynx: Oropharynx is clear.   Eyes:      Extraocular Movements: Extraocular movements intact.      Conjunctiva/sclera: Conjunctivae normal.   Cardiovascular:      Rate and Rhythm: Normal rate and regular rhythm.      Pulses: Normal pulses.           Dorsalis pedis pulses are 2+ on the right side and 2+ on the left side.        Posterior tibial pulses are 2+ on the right side and 2+ on the left side.      Heart sounds: Normal heart sounds. No murmur heard.  Pulmonary:      Effort: Pulmonary effort is normal. No respiratory distress.      Breath sounds: Normal breath sounds. No wheezing or rales.   Abdominal:      General: Abdomen is flat. Bowel sounds are normal.      Palpations: Abdomen is soft. There is no mass.      Tenderness: There is no right CVA tenderness or left CVA tenderness.   Musculoskeletal:         General: Normal range of motion.      Cervical back: Normal range of motion and neck supple. No rigidity.      Right foot: Deformity (pes planus) present.      Left foot: Deformity (pes planus) present.   Feet:      Right foot:      Skin integrity: Callus present.      Toenail Condition: Right toenails are normal.      Left foot:      Skin integrity: Callus present.      Toenail Condition: Left toenails are normal.   Skin:     General: Skin is warm and dry.   Neurological:      General: No focal deficit present.      Mental Status: He is alert and oriented to person, place, and time.      Motor: No weakness.      Gait: Gait normal.   Psychiatric:         Mood and Affect: Mood normal.      Comments: Flat  affect      ASSESSMENT/PLAN:  1. Schizophrenia, paranoid type  Assessment & Plan:  No medication changes at this time. Met with Dr. Camara on 5/19/23. Denies SI/HI hurting self or others. Denies any mood changes. Sleeping at least 8 hours with intermittent awakening but falls back to sleep.  Eating healthy.         2. Class 1 obesity with body mass index (BMI) of 32.0 to 32.9 in adult, unspecified obesity type, unspecified whether serious comorbidity present  Overview:  Wt Readings from Last 8 Encounters:   05/29/23 88 kg (194 lb)   01/03/23 87.1 kg (192 lb)   10/24/22 83.5 kg (184 lb)   07/26/22 81.6 kg (180 lb)   03/07/22 88.3 kg (194 lb 12.4 oz)   02/14/22 86.6 kg (191 lb)   02/07/22 86.8 kg (191 lb 6.4 oz)   07/30/12 91.6 kg (202 lb)         Assessment & Plan:  Recommendations:   Stay physically active. As tolerated alternate resistance training with stretching and cardio. Goal of 150 minutes per week of moderate intensity activity or 7,500 - 10,000 steps per day. Follow the Mediterranean Diet. Include whole fresh fruits, vegetables, olive oil, seeds, nuts, whole grains, cold water fish, salmon, mackerel and lean cuts of meat.  Do not drink sugary/diet carbonated beverages. Decrease portion sizes slightly which will result in an approximately 500-calorie deficit. Avoid fast or fried and processed food, especially canned foods. Avoid refined carbohydrates, white starchy foods, flour, white potato, bread, muffins, and cakes. Consider substituting one meal a day with a meal replacement such as Slim fast, lean cuisine, or weight watcher's. Follow a healthy diet that includes enough calcium, vitamin D and proteins for bone health.        3. Tarsal tunnel syndrome of right side  Assessment & Plan:  Stable. No worsening symptoms. Continue conservative management of pain. Gait with right bow legged.  - Tylenol  mg + ibuprofen 200 mg take twice daily and may increase to three times a day as needed  - wearing  appropriate shoes with proper arch support with firm insoles, such as Superfeet Blue  - continue with ambulation and avoid strenuous activity until heal pain resolves  - in the morning, when ankle pain is the worst, take iced bottle water and roll feet over        4. Immunization declined    5. Colonoscopy refused        Other than changes above, continue current medications and maintain follow up with specialists.      Follow up in about 6 months (around 11/29/2023) for Annual.   No results found for this or any previous visit (from the past 2016 hour(s)).      Kazumi G Yoshinaga, DO [facility]

## 2023-05-29 NOTE — ASSESSMENT & PLAN NOTE
Stable. No worsening symptoms. Continue conservative management of pain. Gait with right bow legged.  - Tylenol  mg + ibuprofen 200 mg take twice daily and may increase to three times a day as needed  - wearing appropriate shoes with proper arch support with firm insoles, such as Superfeet Blue  - continue with ambulation and avoid strenuous activity until heal pain resolves  - in the morning, when ankle pain is the worst, take iced bottle water and roll feet over

## 2023-06-06 ENCOUNTER — PATIENT OUTREACH (OUTPATIENT)
Dept: ADMINISTRATIVE | Facility: HOSPITAL | Age: 63
End: 2023-06-06
Payer: MEDICARE

## 2023-06-19 ENCOUNTER — PATIENT OUTREACH (OUTPATIENT)
Dept: ADMINISTRATIVE | Facility: HOSPITAL | Age: 63
End: 2023-06-19
Payer: MEDICARE

## 2023-08-07 ENCOUNTER — PES CALL (OUTPATIENT)
Dept: ADMINISTRATIVE | Facility: CLINIC | Age: 63
End: 2023-08-07
Payer: MEDICARE

## 2023-10-04 ENCOUNTER — PATIENT OUTREACH (OUTPATIENT)
Dept: ADMINISTRATIVE | Facility: HOSPITAL | Age: 63
End: 2023-10-04
Payer: MEDICARE

## 2023-10-04 NOTE — PROGRESS NOTES
Chart reviewed, immunization record updated.  No new results noted on [facility] or [facility] web site.  Care Everywhere updated.   Patient care coordination note updated.   Upcoming PCP visit updated.  Next PCP visit 11/27/2023.  LOV with PCP 05/29/2023.  Contacted patient to discuss Colorectal cancer screening and rescheduling Enhanced Annual Wellness visit.  Patient declines Colorectal screening and eAWV visit.

## 2023-11-27 ENCOUNTER — OFFICE VISIT (OUTPATIENT)
Dept: PRIMARY CARE CLINIC | Facility: CLINIC | Age: 63
End: 2023-11-27
Payer: MEDICARE

## 2023-11-27 VITALS
HEIGHT: 65 IN | SYSTOLIC BLOOD PRESSURE: 132 MMHG | HEART RATE: 51 BPM | BODY MASS INDEX: 31.65 KG/M2 | DIASTOLIC BLOOD PRESSURE: 63 MMHG | WEIGHT: 190 LBS | TEMPERATURE: 98 F | RESPIRATION RATE: 16 BRPM | OXYGEN SATURATION: 98 %

## 2023-11-27 DIAGNOSIS — Z28.21 IMMUNIZATION DECLINED: ICD-10-CM

## 2023-11-27 DIAGNOSIS — M21.42 BILATERAL PES PLANUS: ICD-10-CM

## 2023-11-27 DIAGNOSIS — L89.306 PRESSURE INJURY OF DEEP TISSUE OF BUTTOCK, UNSPECIFIED LATERALITY: ICD-10-CM

## 2023-11-27 DIAGNOSIS — Z00.00 PHYSICAL EXAM: ICD-10-CM

## 2023-11-27 DIAGNOSIS — M21.41 BILATERAL PES PLANUS: ICD-10-CM

## 2023-11-27 DIAGNOSIS — Z53.20 COLON CANCER SCREENING DECLINED: Primary | ICD-10-CM

## 2023-11-27 DIAGNOSIS — Z11.3 ROUTINE SCREENING FOR STI (SEXUALLY TRANSMITTED INFECTION): ICD-10-CM

## 2023-11-27 DIAGNOSIS — F20.0 SCHIZOPHRENIA, PARANOID TYPE: ICD-10-CM

## 2023-11-27 PROCEDURE — 99213 OFFICE O/P EST LOW 20 MIN: CPT | Mod: PBBFAC,PO | Performed by: STUDENT IN AN ORGANIZED HEALTH CARE EDUCATION/TRAINING PROGRAM

## 2023-11-27 PROCEDURE — 99214 PR OFFICE/OUTPT VISIT, EST, LEVL IV, 30-39 MIN: ICD-10-PCS | Mod: S$PBB,,, | Performed by: STUDENT IN AN ORGANIZED HEALTH CARE EDUCATION/TRAINING PROGRAM

## 2023-11-27 PROCEDURE — 99214 OFFICE O/P EST MOD 30 MIN: CPT | Mod: S$PBB,,, | Performed by: STUDENT IN AN ORGANIZED HEALTH CARE EDUCATION/TRAINING PROGRAM

## 2023-11-27 PROCEDURE — 99999 PR PBB SHADOW E&M-EST. PATIENT-LVL III: ICD-10-PCS | Mod: PBBFAC,,, | Performed by: STUDENT IN AN ORGANIZED HEALTH CARE EDUCATION/TRAINING PROGRAM

## 2023-11-27 PROCEDURE — 99999 PR PBB SHADOW E&M-EST. PATIENT-LVL III: CPT | Mod: PBBFAC,,, | Performed by: STUDENT IN AN ORGANIZED HEALTH CARE EDUCATION/TRAINING PROGRAM

## 2023-11-27 NOTE — ASSESSMENT & PLAN NOTE
Discussed risks and benefits of early detection and available therapy options. Patient voices understanding and does not want colonoscopy scheduled for stool studies sent in.   Patient denies symptoms at this time. Regular bowel movements, denies blood or melena, diarrhea.   - f/u as needed

## 2023-11-27 NOTE — PROGRESS NOTES
[facility] Note    HPI:  Karan Hoover is a 63 y.o. male who presents today for Health Maintenance (6 month health maintenance)  Reviewed all health maintenance checks. Patient declines colon cancer screen as he finds himself in overall good, stable health.   Feeling well. Does not need any vision or dental check up as he can see and drive and eat and chew without difficulties.   Denies recent sick contacts.   Denies fever, chills, excessive headaches, vision changes, chest pain, palpitations, shortness of breath, abdominal pain, nausea, vomiting, blood in urine, blood in stool, diarrhea, constipation.     ROS   A review of systems was performed and was negative except as noted above.    I personally reviewed allergies, past medical, surgical, social and family history and updated as appropriate.    Medications:    Current Outpatient Medications:     ammonium lactate 12 % Crea, Apply 1 g topically 2 (two) times daily. Apply over affected area of skin., Disp: 385 g, Rfl: 1    VRAYLAR 6 mg Cap, Take 1 capsule by mouth nightly., Disp: , Rfl:      Health Maintenance:    There is no immunization history on file for this patient.   Health Maintenance   Topic Date Due    TETANUS VACCINE  Never done    Colorectal Cancer Screening  Never done    Shingles Vaccine (1 of 2) Never done    Lipid Panel  07/26/2027    Hepatitis C Screening  Completed     Health Maintenance Topics with due status: Not Due       Topic Last Completion Date    Lipid Panel 07/26/2022     Health Maintenance Due   Topic Date Due    COVID-19 Vaccine (1) Never done    TETANUS VACCINE  Never done    Hemoglobin A1c (Diabetic Prevention Screening)  Never done    Colorectal Cancer Screening  Never done    Shingles Vaccine (1 of 2) Never done    RSV Vaccine (Age 60+ and Pregnant patients) (1 - 1-dose 60+ series) Never done    Influenza Vaccine (1) Never done       PHYSICAL EXAM:  Vitals:    11/27/23 0821   BP: 132/63   BP Location: Right arm  "  Patient Position: Sitting   BP Method: Large (Automatic)   Pulse: (!) 51   Resp: 16   Temp: 97.9 °F (36.6 °C)   SpO2: 98%   Weight: 86.2 kg (190 lb)   Height: 5' 5" (1.651 m)     Body mass index is 31.62 kg/m².  Physical Exam  Vitals and nursing note reviewed.   Constitutional:       General: He is not in acute distress.     Appearance: Normal appearance. He is not toxic-appearing.   HENT:      Head: Normocephalic and atraumatic.      Right Ear: External ear normal.      Left Ear: External ear normal.      Nose: Nose normal.      Mouth/Throat:      Mouth: Mucous membranes are moist.      Pharynx: Oropharynx is clear.   Eyes:      Extraocular Movements: Extraocular movements intact.      Conjunctiva/sclera: Conjunctivae normal.   Cardiovascular:      Rate and Rhythm: Normal rate and regular rhythm.      Pulses: Normal pulses.           Dorsalis pedis pulses are 2+ on the right side and 2+ on the left side.        Posterior tibial pulses are 2+ on the right side and 2+ on the left side.      Heart sounds: Normal heart sounds. No murmur heard.  Pulmonary:      Effort: Pulmonary effort is normal. No respiratory distress.      Breath sounds: Normal breath sounds. No wheezing or rales.   Abdominal:      General: Abdomen is flat. Bowel sounds are normal.      Palpations: Abdomen is soft. There is no mass.      Tenderness: There is no right CVA tenderness or left CVA tenderness.   Musculoskeletal:         General: Normal range of motion.      Cervical back: Normal range of motion and neck supple. No rigidity.      Right foot: Deformity (pes planus) present.      Left foot: Deformity (pes planus) present.   Feet:      Right foot:      Skin integrity: Callus present.      Toenail Condition: Right toenails are normal.      Left foot:      Skin integrity: Callus present.      Toenail Condition: Left toenails are normal.   Skin:     General: Skin is warm and dry.   Neurological:      General: No focal deficit present.      " Mental Status: He is alert and oriented to person, place, and time.      Motor: No weakness.      Gait: Gait normal.   Psychiatric:         Mood and Affect: Mood normal.      Comments: Flat affect        ASSESSMENT/PLAN:  1. Colon cancer screening declined  Assessment & Plan:  Discussed risks and benefits of early detection and available therapy options. Patient voices understanding and does not want colonoscopy scheduled for stool studies sent in.   Patient denies symptoms at this time. Regular bowel movements, denies blood or melena, diarrhea.   - f/u as needed      2. Schizophrenia, paranoid type  Assessment & Plan:  No medication changes at this time.   Patient continues to meet with Dr. Camara.   Denies SI/HI hurting self or others. Denies any mood changes, increased agitation, irritability, anxiety.   Sleeping at least 8 hours.   Eating well and states not exercising much.   - encouraged following a balanced diet, choosing healthier foods and incorporating daily physical activity for weight management  - f/u notes from  specialist         3. Routine screening for STI (sexually transmitted infection)  Assessment & Plan:          4. Immunization declined  Assessment & Plan:  Declines immunizations against influenza, COVID19, pneumococcal.         5. BMI 32.0-32.9,adult  Overview:  Wt Readings from Last 8 Encounters:   11/27/23 86.2 kg (190 lb)   05/29/23 88 kg (194 lb)   01/03/23 87.1 kg (192 lb)   10/24/22 83.5 kg (184 lb)   07/26/22 81.6 kg (180 lb)   03/07/22 88.3 kg (194 lb 12.4 oz)   02/14/22 86.6 kg (191 lb)   02/07/22 86.8 kg (191 lb 6.4 oz)         Assessment & Plan:  Stable weight over the past 12 months. Recommendations:   Stay physically active. As tolerated alternate resistance training with stretching and cardio. Goal of 150 minutes per week of moderate intensity activity or 7,500 - 10,000 steps per day. Follow the Mediterranean Diet. Include whole fresh fruits, vegetables, olive oil, seeds, nuts,  "whole grains, cold water fish, salmon, mackerel and lean cuts of meat.  Do not drink sugary/diet carbonated beverages. Decrease portion sizes slightly which will result in an approximately 500-calorie deficit. Avoid fast or fried and processed food, especially canned foods. Avoid refined carbohydrates, white starchy foods, flour, white potato, bread, muffins, and cakes. Consider substituting one meal a day with a meal replacement such as Slim fast, lean cuisine, or weight watcher's. Follow a healthy diet that includes enough calcium, vitamin D and proteins for bone health.          6. Bilateral pes planus  Assessment & Plan:  Stable. Minimal complaint. Encouraged use of firm insoles to help with arch and weight distribution.         7. Pressure injury of deep tissue of buttock, unspecified laterality  Assessment & Plan:          8. Physical exam  Assessment & Plan:  Patient feels well overall.   Declines blood work at this time. States will only do labs if  "I feel sick and need it then."  Normal physical exam. Counseled on preventive care including not limited to colon cancer screen, cardiovascular risk reduction.   - provided names and telephone numbers for eye and dental exam          Other than changes above, continue current medications and maintain follow up with specialists.      Follow up in about 6 months (around 5/27/2024).   No results found for this or any previous visit (from the past 2016 hour(s)).      DO Aaron [facility]                  "

## 2023-11-28 PROBLEM — Z00.00 PHYSICAL EXAM: Status: ACTIVE | Noted: 2023-11-27

## 2023-11-28 PROBLEM — G57.51 TARSAL TUNNEL SYNDROME OF RIGHT SIDE: Status: RESOLVED | Noted: 2022-03-07 | Resolved: 2023-11-28

## 2023-11-28 PROBLEM — L89.306 PRESSURE INJURY OF DEEP TISSUE OF BUTTOCK: Status: RESOLVED | Noted: 2022-07-26 | Resolved: 2023-11-28

## 2023-11-28 NOTE — ASSESSMENT & PLAN NOTE
Stable. Minimal complaint. Encouraged use of firm insoles to help with arch and weight distribution.

## 2023-11-28 NOTE — ASSESSMENT & PLAN NOTE
No medication changes at this time.   Patient continues to meet with Dr. Camara.   Denies SI/HI hurting self or others. Denies any mood changes, increased agitation, irritability, anxiety.   Sleeping at least 8 hours.   Eating well and states not exercising much.   - encouraged following a balanced diet, choosing healthier foods and incorporating daily physical activity for weight management  - f/u notes from  specialist

## 2023-11-28 NOTE — ASSESSMENT & PLAN NOTE
"Patient feels well overall.   Declines blood work at this time. States will only do labs if  "I feel sick and need it then."  Normal physical exam. Counseled on preventive care including not limited to colon cancer screen, cardiovascular risk reduction.   - provided names and telephone numbers for eye and dental exam  "

## 2023-11-28 NOTE — ASSESSMENT & PLAN NOTE
Stable weight over the past 12 months. Recommendations:   Stay physically active. As tolerated alternate resistance training with stretching and cardio. Goal of 150 minutes per week of moderate intensity activity or 7,500 - 10,000 steps per day. Follow the Mediterranean Diet. Include whole fresh fruits, vegetables, olive oil, seeds, nuts, whole grains, cold water fish, salmon, mackerel and lean cuts of meat.  Do not drink sugary/diet carbonated beverages. Decrease portion sizes slightly which will result in an approximately 500-calorie deficit. Avoid fast or fried and processed food, especially canned foods. Avoid refined carbohydrates, white starchy foods, flour, white potato, bread, muffins, and cakes. Consider substituting one meal a day with a meal replacement such as Slim fast, lean cuisine, or weight watcher's. Follow a healthy diet that includes enough calcium, vitamin D and proteins for bone health.

## 2023-12-21 ENCOUNTER — PATIENT OUTREACH (OUTPATIENT)
Dept: ADMINISTRATIVE | Facility: HOSPITAL | Age: 63
End: 2023-12-21
Payer: MEDICARE

## 2024-02-06 DIAGNOSIS — Z12.11 COLON CANCER SCREENING: ICD-10-CM

## 2024-03-04 PROBLEM — Z00.00 PHYSICAL EXAM: Status: RESOLVED | Noted: 2023-11-27 | Resolved: 2024-03-04

## 2024-04-11 ENCOUNTER — PATIENT OUTREACH (OUTPATIENT)
Dept: ADMINISTRATIVE | Facility: HOSPITAL | Age: 64
End: 2024-04-11
Payer: MEDICARE

## 2024-04-11 NOTE — PROGRESS NOTES
Spoke to Mr Russo explained the importance of colorectal cancer screen explained there is no better screening than a colonoscopy. But this is a screen that may detect something. He declined any screening

## 2024-05-27 ENCOUNTER — OFFICE VISIT (OUTPATIENT)
Dept: PRIMARY CARE CLINIC | Facility: CLINIC | Age: 64
End: 2024-05-27
Payer: MEDICARE

## 2024-05-27 VITALS
OXYGEN SATURATION: 98 % | HEIGHT: 65 IN | SYSTOLIC BLOOD PRESSURE: 112 MMHG | DIASTOLIC BLOOD PRESSURE: 56 MMHG | HEART RATE: 42 BPM | BODY MASS INDEX: 30.82 KG/M2 | TEMPERATURE: 98 F | RESPIRATION RATE: 16 BRPM | WEIGHT: 185 LBS

## 2024-05-27 DIAGNOSIS — Z13.220 ENCOUNTER FOR LIPID SCREENING FOR CARDIOVASCULAR DISEASE: ICD-10-CM

## 2024-05-27 DIAGNOSIS — Z79.899 LONG TERM CURRENT USE OF ANTIPSYCHOTIC MEDICATION: ICD-10-CM

## 2024-05-27 DIAGNOSIS — K12.0 CANKER SORES ORAL: Primary | ICD-10-CM

## 2024-05-27 DIAGNOSIS — Z28.21 IMMUNIZATION DECLINED: ICD-10-CM

## 2024-05-27 DIAGNOSIS — R53.82 CHRONIC FATIGUE, UNSPECIFIED: ICD-10-CM

## 2024-05-27 DIAGNOSIS — Z13.6 ENCOUNTER FOR LIPID SCREENING FOR CARDIOVASCULAR DISEASE: ICD-10-CM

## 2024-05-27 DIAGNOSIS — Z53.20 COLON CANCER SCREENING DECLINED: ICD-10-CM

## 2024-05-27 DIAGNOSIS — F20.0 SCHIZOPHRENIA, PARANOID TYPE: ICD-10-CM

## 2024-05-27 DIAGNOSIS — Z00.00 ANNUAL PHYSICAL EXAM: ICD-10-CM

## 2024-05-27 DIAGNOSIS — Z13.1 DIABETES MELLITUS SCREENING: ICD-10-CM

## 2024-05-27 PROCEDURE — 99999 PR PBB SHADOW E&M-EST. PATIENT-LVL IV: CPT | Mod: PBBFAC,,, | Performed by: STUDENT IN AN ORGANIZED HEALTH CARE EDUCATION/TRAINING PROGRAM

## 2024-05-27 PROCEDURE — 99214 OFFICE O/P EST MOD 30 MIN: CPT | Mod: S$PBB,,, | Performed by: STUDENT IN AN ORGANIZED HEALTH CARE EDUCATION/TRAINING PROGRAM

## 2024-05-27 PROCEDURE — 99214 OFFICE O/P EST MOD 30 MIN: CPT | Mod: PBBFAC | Performed by: STUDENT IN AN ORGANIZED HEALTH CARE EDUCATION/TRAINING PROGRAM

## 2024-05-27 NOTE — ASSESSMENT & PLAN NOTE
Stable weight over the past 12 months.   Recommendations:   Stay physically active. Sleep 7-8 hours at bedtime.   As tolerated alternate resistance training with stretching and cardio. Goal of 150 minutes per week of moderate intensity activity or 7,500 - 10,000 steps per day. Follow the Mediterranean Diet. Include whole fresh fruits, vegetables, olive oil, seeds, nuts, whole grains, cold water fish, salmon, mackerel and lean cuts of meat.  Do not drink sugary/diet carbonated beverages. Decrease portion sizes slightly which will result in an approximately 500-calorie deficit. Avoid fast or fried and processed food, especially canned foods. Avoid refined carbohydrates, white starchy foods, flour, white potato, bread, muffins, and cakes. Consider substituting one meal a day with a meal replacement such as Slim fast, lean cuisine, or weight watcher's. Follow a healthy diet that includes enough calcium, vitamin D and proteins for bone health.

## 2024-05-27 NOTE — PROGRESS NOTES
[facility] Note    HPI:  Karan Hoover is a 63 y.o. male who presents today for Health Maintenance (6 month health maintenance, patient states he has a cold sore)    63 year old   Patient declines colon cancer screen as he finds himself in overall good, stable health.   Feeling well. Does not need any vision or dental check up as he can see and drive and eat and chew without difficulties.   Denies recent sick contacts.   Denies fever, chills, excessive headaches, vision changes, chest pain, palpitations, shortness of breath, abdominal pain, nausea, vomiting, blood in urine, blood in stool, diarrhea, constipation.     ROS   A review of systems was performed and was negative except as noted above.    I personally reviewed allergies, past medical, surgical, social and family history and updated as appropriate.    Medications:    Current Outpatient Medications:     VRAYLAR 6 mg Cap, Take 1 capsule by mouth nightly., Disp: , Rfl:     benzocaine 20 % Spry, Use as directed 2 each in the mouth or throat 4 (four) times daily as needed (oral sore and pain)., Disp: 10 mL, Rfl: 0     Health Maintenance:    There is no immunization history on file for this patient.   Health Maintenance   Topic Date Due    TETANUS VACCINE  Never done    Colorectal Cancer Screening  Never done    Shingles Vaccine (1 of 2) Never done    Lipid Panel  07/26/2027    Hepatitis C Screening  Completed     Health Maintenance Topics with due status: Not Due       Topic Last Completion Date    Lipid Panel 07/26/2022    Influenza Vaccine Not Due     Health Maintenance Due   Topic Date Due    TETANUS VACCINE  Never done    Hemoglobin A1c (Diabetic Prevention Screening)  Never done    Colorectal Cancer Screening  Never done    Shingles Vaccine (1 of 2) Never done    RSV Vaccine (Age 60+ and Pregnant patients) (1 - 1-dose 60+ series) Never done    COVID-19 Vaccine (1 - 2023-24 season) Never done     PHYSICAL EXAM:  Vitals:    05/27/24 0829   BP:  "(!) 112/56   BP Location: Right arm   Patient Position: Sitting   BP Method: Large (Automatic)   Pulse: (!) 42   Resp: 16   Temp: 98.1 °F (36.7 °C)   SpO2: 98%   Weight: 83.9 kg (185 lb)   Height: 5' 5" (1.651 m)     Body mass index is 30.79 kg/m².  Physical Exam  Vitals and nursing note reviewed.   Constitutional:       General: He is not in acute distress.     Appearance: Normal appearance. He is not toxic-appearing.   HENT:      Head: Normocephalic and atraumatic.      Right Ear: External ear normal.      Left Ear: External ear normal.      Nose: Nose normal.      Mouth/Throat:      Mouth: Mucous membranes are moist.      Pharynx: Oropharynx is clear.   Eyes:      Extraocular Movements: Extraocular movements intact.      Conjunctiva/sclera: Conjunctivae normal.   Cardiovascular:      Rate and Rhythm: Normal rate and regular rhythm.      Pulses: Normal pulses.           Dorsalis pedis pulses are 2+ on the right side and 2+ on the left side.        Posterior tibial pulses are 2+ on the right side and 2+ on the left side.      Heart sounds: Normal heart sounds. No murmur heard.  Pulmonary:      Effort: Pulmonary effort is normal. No respiratory distress.      Breath sounds: Normal breath sounds. No wheezing or rales.   Abdominal:      General: Abdomen is flat. Bowel sounds are normal.      Palpations: Abdomen is soft. There is no mass.      Tenderness: There is no right CVA tenderness or left CVA tenderness.   Musculoskeletal:         General: Normal range of motion.      Cervical back: Normal range of motion and neck supple. No rigidity.      Right foot: Deformity (pes planus) present.      Left foot: Deformity (pes planus) present.   Feet:      Right foot:      Skin integrity: Callus present.      Toenail Condition: Right toenails are normal.      Left foot:      Skin integrity: Callus present.      Toenail Condition: Left toenails are normal.   Skin:     General: Skin is warm and dry.   Neurological:      " General: No focal deficit present.      Mental Status: He is alert and oriented to person, place, and time.      Motor: No weakness.      Gait: Gait normal.   Psychiatric:         Mood and Affect: Mood normal.      Comments: Flat affect        ASSESSMENT/PLAN:  1. Canker sores oral  Assessment & Plan:  Per patient, chronic vraylar use resulting in immune suppression. Denies associated fever, chills, headaches, recent sick contacts. Patient declines lab work to check for leukopenia.  - rx topical ointment for blister  - f/u 2 weeks or sooner with worsening    Orders:  -     benzocaine 20 % Spry; Use as directed 2 each in the mouth or throat 4 (four) times daily as needed (oral sore and pain).  Dispense: 10 mL; Refill: 0    2. Schizophrenia, paranoid type  -     Lipid Panel; Future; Expected date: 05/27/2024  -     Comprehensive Metabolic Panel; Future; Expected date: 05/27/2024  -     CBC Auto Differential; Future; Expected date: 05/27/2024  -     TSH; Future; Expected date: 05/27/2024  -     Vitamin D; Future; Expected date: 05/27/2024    3. Annual physical exam  -     Lipid Panel; Future; Expected date: 05/27/2024  -     Comprehensive Metabolic Panel; Future; Expected date: 05/27/2024  -     CBC Auto Differential; Future; Expected date: 05/27/2024  -     TSH; Future; Expected date: 05/27/2024  -     Vitamin D; Future; Expected date: 05/27/2024    4. Immunization declined    5. Diabetes mellitus screening  -     Comprehensive Metabolic Panel; Future; Expected date: 05/27/2024  -     TSH; Future; Expected date: 05/27/2024    6. Encounter for lipid screening for cardiovascular disease  -     Lipid Panel; Future; Expected date: 05/27/2024    7. Chronic fatigue, unspecified  -     TSH; Future; Expected date: 05/27/2024    8. Colon cancer screening declined  -     Fecal Immunochemical Test (iFOBT); Future; Expected date: 05/27/2024    9. BMI 32.0-32.9,adult  Overview:  Wt Readings from Last 8 Encounters:   05/27/24 83.9  kg (185 lb)   11/27/23 86.2 kg (190 lb)   05/29/23 88 kg (194 lb)   01/03/23 87.1 kg (192 lb)   10/24/22 83.5 kg (184 lb)   07/26/22 81.6 kg (180 lb)   03/07/22 88.3 kg (194 lb 12.4 oz)   02/14/22 86.6 kg (191 lb)         Assessment & Plan:  Stable weight over the past 12 months.   Recommendations:   Stay physically active. Sleep 7-8 hours at bedtime.   As tolerated alternate resistance training with stretching and cardio. Goal of 150 minutes per week of moderate intensity activity or 7,500 - 10,000 steps per day. Follow the Mediterranean Diet. Include whole fresh fruits, vegetables, olive oil, seeds, nuts, whole grains, cold water fish, salmon, mackerel and lean cuts of meat.  Do not drink sugary/diet carbonated beverages. Decrease portion sizes slightly which will result in an approximately 500-calorie deficit. Avoid fast or fried and processed food, especially canned foods. Avoid refined carbohydrates, white starchy foods, flour, white potato, bread, muffins, and cakes. Consider substituting one meal a day with a meal replacement such as Slim fast, lean cuisine, or weight watcher's. Follow a healthy diet that includes enough calcium, vitamin D and proteins for bone health.        Orders:  -     Lipid Panel; Future; Expected date: 05/27/2024  -     Vitamin D; Future; Expected date: 05/27/2024    10. Long term current use of antipsychotic medication  Assessment & Plan:  Strongly encouraged patient with follow up lab work to check chemistry labs, CBC, endocrine functions, TSH, A1C, prolactin, hyperlipidemia/metabolic syndrome, but patient declines all lab work.   - f/u notes from psychiatry specialist  - continue counseling and monitor adverse effects    Orders:  -     Lipid Panel; Future; Expected date: 05/27/2024  -     Comprehensive Metabolic Panel; Future; Expected date: 05/27/2024  -     CBC Auto Differential; Future; Expected date: 05/27/2024  -     TSH; Future; Expected date: 05/27/2024  -     Vitamin D;  Future; Expected date: 05/27/2024    11. BMI 30.0-30.9,adult  Overview:  Wt Readings from Last 8 Encounters:   05/27/24 83.9 kg (185 lb)   11/27/23 86.2 kg (190 lb)   05/29/23 88 kg (194 lb)   01/03/23 87.1 kg (192 lb)   10/24/22 83.5 kg (184 lb)   07/26/22 81.6 kg (180 lb)   03/07/22 88.3 kg (194 lb 12.4 oz)   02/14/22 86.6 kg (191 lb)         Assessment & Plan:  Stable weight over the past 12 months.   Recommendations:   Stay physically active. Sleep 7-8 hours at bedtime.   As tolerated alternate resistance training with stretching and cardio. Goal of 150 minutes per week of moderate intensity activity or 7,500 - 10,000 steps per day. Follow the Mediterranean Diet. Include whole fresh fruits, vegetables, olive oil, seeds, nuts, whole grains, cold water fish, salmon, mackerel and lean cuts of meat.  Do not drink sugary/diet carbonated beverages. Decrease portion sizes slightly which will result in an approximately 500-calorie deficit. Avoid fast or fried and processed food, especially canned foods. Avoid refined carbohydrates, white starchy foods, flour, white potato, bread, muffins, and cakes. Consider substituting one meal a day with a meal replacement such as Slim fast, lean cuisine, or weight watcher's. Follow a healthy diet that includes enough calcium, vitamin D and proteins for bone health.              Other than changes above, continue current medications and maintain follow up with specialists.      Follow up in about 6 months (around 11/27/2024) for Lab review.   No results found for this or any previous visit (from the past 2016 hour(s)).      Kazumi G Yoshinaga, DO [facility]

## 2024-05-28 NOTE — ASSESSMENT & PLAN NOTE
Strongly encouraged patient with follow up lab work to check chemistry labs, CBC, endocrine functions, TSH, A1C, prolactin, hyperlipidemia/metabolic syndrome, but patient declines all lab work.   - f/u notes from psychiatry specialist  - continue counseling and monitor adverse effects

## 2024-05-28 NOTE — ASSESSMENT & PLAN NOTE
Per patient, chronic vraylar use resulting in immune suppression. Denies associated fever, chills, headaches, recent sick contacts. Patient declines lab work to check for leukopenia.  - rx topical ointment for blister  - f/u 2 weeks or sooner with worsening

## 2024-08-26 PROBLEM — Z00.00 ANNUAL PHYSICAL EXAM: Status: RESOLVED | Noted: 2024-05-27 | Resolved: 2024-08-26

## 2024-08-26 PROBLEM — Z13.1 DIABETES MELLITUS SCREENING: Status: RESOLVED | Noted: 2024-05-27 | Resolved: 2024-08-26

## 2024-08-26 PROBLEM — Z13.220 ENCOUNTER FOR LIPID SCREENING FOR CARDIOVASCULAR DISEASE: Status: RESOLVED | Noted: 2024-05-27 | Resolved: 2024-08-26

## 2024-08-26 PROBLEM — Z13.6 ENCOUNTER FOR LIPID SCREENING FOR CARDIOVASCULAR DISEASE: Status: RESOLVED | Noted: 2024-05-27 | Resolved: 2024-08-26

## 2025-02-13 ENCOUNTER — PATIENT OUTREACH (OUTPATIENT)
Dept: ADMINISTRATIVE | Facility: HOSPITAL | Age: 65
End: 2025-02-13
Payer: MEDICARE

## 2025-02-13 NOTE — PROGRESS NOTES
Patient  states he is not sick and does not want to make an appointment. He understands that Dr. Liu is no longer with [facility] and still does not agree to make an appointment to re establish care at this time.

## 2025-06-10 ENCOUNTER — HOSPITAL ENCOUNTER (EMERGENCY)
Facility: HOSPITAL | Age: 65
Discharge: HOME | End: 2025-06-10
Attending: EMERGENCY MEDICINE
Payer: MEDICARE

## 2025-06-10 VITALS
HEART RATE: 50 BPM | TEMPERATURE: 98 F | BODY MASS INDEX: 31.22 KG/M2 | SYSTOLIC BLOOD PRESSURE: 125 MMHG | WEIGHT: 187.38 LBS | DIASTOLIC BLOOD PRESSURE: 70 MMHG | RESPIRATION RATE: 20 BRPM | HEIGHT: 65 IN | OXYGEN SATURATION: 100 %

## 2025-06-10 DIAGNOSIS — R22.9 LUMPS ON THE SKIN: Primary | ICD-10-CM

## 2025-06-10 PROCEDURE — 99283 EMERGENCY DEPT VISIT LOW MDM: CPT

## 2025-06-10 RX ORDER — KETOROLAC TROMETHAMINE 10 MG/1
10 TABLET, FILM COATED ORAL EVERY 6 HOURS
Qty: 20 TABLET | Refills: 0 | Status: SHIPPED | OUTPATIENT
Start: 2025-06-10 | End: 2025-06-15

## 2025-06-10 NOTE — ED PROVIDER NOTES
Encounter Date: 6/10/2025       History     Chief Complaint   Patient presents with    Arm Pain     Pt stated that he has been experiencing intermittent left elbow / proximal forearm pain for the past 10 years. Has not seen PCP for this problem. Presents to ED with worsening pain.      64-year-old female presents emergency room with intermittent swollen skin tone bump noted above left elbow which has been there for the last 10 years.  Patient reports pain is worse today.  Denies hitting area, redness, fever, chills.  Full range of motion of arm.  Patient has not seen his PCP for this issue.        Review of patient's allergies indicates:  No Known Allergies  Past Medical History:   Diagnosis Date    Behavioral problem     History of psychiatric hospitalization     Hyperpigmentation of skin 10/24/2022    Pressure injury of deep tissue of buttock 07/26/2022    Psychosis     Tarsal tunnel syndrome of right side 03/07/2022     Past Surgical History:   Procedure Laterality Date    HERNIA REPAIR       No family history on file.  Social History[1]  Review of Systems   Constitutional:  Negative for fever.   HENT:  Negative for sore throat.    Respiratory:  Negative for shortness of breath.    Cardiovascular:  Negative for chest pain.   Gastrointestinal:  Negative for nausea.   Genitourinary:  Negative for dysuria.   Musculoskeletal:  Negative for back pain.   Skin:  Negative for rash.   Neurological:  Negative for weakness.   Hematological:  Does not bruise/bleed easily.   All other systems reviewed and are negative.      Physical Exam     Initial Vitals   BP Pulse Resp Temp SpO2   06/10/25 0815 06/10/25 0814 06/10/25 0814 06/10/25 0814 06/10/25 0814   125/70 (!) 50 20 98.4 °F (36.9 °C) 100 %      MAP       --                Physical Exam    Nursing note and vitals reviewed.  Constitutional: He appears well-developed and well-nourished.   HENT:   Head: Normocephalic and atraumatic.   Eyes: Pupils are equal, round, and  reactive to light.   Neck:   Normal range of motion.  Musculoskeletal:         General: Normal range of motion.      Cervical back: Normal range of motion.     Neurological: He is alert and oriented to person, place, and time.   Skin:   Small skin tone lump noted above left elbow posterior aspect which is tender to touch.  No fluctuation, redness, drainage.  Severely tender on palpation.   Psychiatric: He has a normal mood and affect.         ED Course   Procedures  Labs Reviewed - No data to display       Imaging Results    None          Medications - No data to display  Medical Decision Making                                    Clinical Impression:  Final diagnoses:  [R22.9] Lumps on the skin (Primary)          ED Disposition Condition    Discharge Stable          ED Prescriptions       Medication Sig Dispense Start Date End Date Auth. Provider    ketorolac (TORADOL) 10 mg tablet Take 1 tablet (10 mg total) by mouth every 6 (six) hours. for 5 days 20 tablet 6/10/2025 6/15/2025 Yumiko Faustin NP          Follow-up Information    None                [1]   Social History  Tobacco Use    Smoking status: Never     Passive exposure: Never    Smokeless tobacco: Never   Substance Use Topics    Alcohol use: No    Drug use: No        Yumiko Faustin NP  06/10/25 0807